# Patient Record
Sex: MALE | Race: WHITE | NOT HISPANIC OR LATINO | URBAN - METROPOLITAN AREA
[De-identification: names, ages, dates, MRNs, and addresses within clinical notes are randomized per-mention and may not be internally consistent; named-entity substitution may affect disease eponyms.]

---

## 2019-01-26 ENCOUNTER — INPATIENT (INPATIENT)
Facility: HOSPITAL | Age: 59
LOS: 6 days | Discharge: HOME HEALTH | End: 2019-02-02
Attending: COLON & RECTAL SURGERY | Admitting: COLON & RECTAL SURGERY
Payer: COMMERCIAL

## 2019-01-26 VITALS
SYSTOLIC BLOOD PRESSURE: 100 MMHG | TEMPERATURE: 98 F | DIASTOLIC BLOOD PRESSURE: 70 MMHG | OXYGEN SATURATION: 94 % | HEIGHT: 73 IN | RESPIRATION RATE: 18 BRPM | HEART RATE: 123 BPM | WEIGHT: 225.09 LBS

## 2019-01-26 LAB
ALBUMIN SERPL ELPH-MCNC: 3.6 G/DL — SIGNIFICANT CHANGE UP (ref 3.3–5)
ALP SERPL-CCNC: 47 U/L — SIGNIFICANT CHANGE UP (ref 40–120)
ALT FLD-CCNC: 30 U/L — SIGNIFICANT CHANGE UP (ref 12–78)
ANION GAP SERPL CALC-SCNC: 9 MMOL/L — SIGNIFICANT CHANGE UP (ref 5–17)
APPEARANCE UR: CLEAR — SIGNIFICANT CHANGE UP
APTT BLD: 25.9 SEC — LOW (ref 27.5–36.3)
AST SERPL-CCNC: 17 U/L — SIGNIFICANT CHANGE UP (ref 15–37)
BACTERIA # UR AUTO: ABNORMAL
BASOPHILS # BLD AUTO: 0 K/UL — SIGNIFICANT CHANGE UP (ref 0–0.2)
BASOPHILS NFR BLD AUTO: 0 % — SIGNIFICANT CHANGE UP (ref 0–2)
BILIRUB SERPL-MCNC: 0.8 MG/DL — SIGNIFICANT CHANGE UP (ref 0.2–1.2)
BILIRUB UR-MCNC: NEGATIVE — SIGNIFICANT CHANGE UP
BLD GP AB SCN SERPL QL: SIGNIFICANT CHANGE UP
BUN SERPL-MCNC: 28 MG/DL — HIGH (ref 7–23)
CALCIUM SERPL-MCNC: 9 MG/DL — SIGNIFICANT CHANGE UP (ref 8.5–10.1)
CHLORIDE SERPL-SCNC: 106 MMOL/L — SIGNIFICANT CHANGE UP (ref 96–108)
CO2 SERPL-SCNC: 28 MMOL/L — SIGNIFICANT CHANGE UP (ref 22–31)
COLOR SPEC: YELLOW — SIGNIFICANT CHANGE UP
CREAT SERPL-MCNC: 1.61 MG/DL — HIGH (ref 0.5–1.3)
DIFF PNL FLD: NEGATIVE — SIGNIFICANT CHANGE UP
EOSINOPHIL # BLD AUTO: 0 K/UL — SIGNIFICANT CHANGE UP (ref 0–0.5)
EOSINOPHIL NFR BLD AUTO: 0 % — SIGNIFICANT CHANGE UP (ref 0–6)
EPI CELLS # UR: SIGNIFICANT CHANGE UP
GLUCOSE SERPL-MCNC: 97 MG/DL — SIGNIFICANT CHANGE UP (ref 70–99)
GLUCOSE UR QL: NEGATIVE MG/DL — SIGNIFICANT CHANGE UP
HCT VFR BLD CALC: 45.7 % — SIGNIFICANT CHANGE UP (ref 39–50)
HCV AB S/CO SERPL IA: 0.09 S/CO — SIGNIFICANT CHANGE UP
HCV AB SERPL-IMP: SIGNIFICANT CHANGE UP
HGB BLD-MCNC: 14.9 G/DL — SIGNIFICANT CHANGE UP (ref 13–17)
INR BLD: 1.17 RATIO — HIGH (ref 0.88–1.16)
KETONES UR-MCNC: NEGATIVE — SIGNIFICANT CHANGE UP
LACTATE SERPL-SCNC: 2.2 MMOL/L — HIGH (ref 0.7–2)
LACTATE SERPL-SCNC: 3 MMOL/L — HIGH (ref 0.7–2)
LACTATE SERPL-SCNC: 3.6 MMOL/L — HIGH (ref 0.7–2)
LEUKOCYTE ESTERASE UR-ACNC: NEGATIVE — SIGNIFICANT CHANGE UP
LIDOCAIN IGE QN: 172 U/L — SIGNIFICANT CHANGE UP (ref 73–393)
LYMPHOCYTES # BLD AUTO: 0.65 K/UL — LOW (ref 1–3.3)
LYMPHOCYTES # BLD AUTO: 4 % — LOW (ref 13–44)
MAGNESIUM SERPL-MCNC: 1.8 MG/DL — SIGNIFICANT CHANGE UP (ref 1.6–2.6)
MANUAL SMEAR VERIFICATION: SIGNIFICANT CHANGE UP
MCHC RBC-ENTMCNC: 30.9 PG — SIGNIFICANT CHANGE UP (ref 27–34)
MCHC RBC-ENTMCNC: 32.6 GM/DL — SIGNIFICANT CHANGE UP (ref 32–36)
MCV RBC AUTO: 94.8 FL — SIGNIFICANT CHANGE UP (ref 80–100)
METAMYELOCYTES # FLD: 3 % — HIGH (ref 0–0)
MONOCYTES # BLD AUTO: 0.81 K/UL — SIGNIFICANT CHANGE UP (ref 0–0.9)
MONOCYTES NFR BLD AUTO: 5 % — SIGNIFICANT CHANGE UP (ref 2–14)
MYELOCYTES NFR BLD: 1 % — HIGH (ref 0–0)
NEUTROPHILS # BLD AUTO: 13.57 K/UL — HIGH (ref 1.8–7.4)
NEUTROPHILS NFR BLD AUTO: 59 % — SIGNIFICANT CHANGE UP (ref 43–77)
NEUTS BAND # BLD: 25 % — HIGH (ref 0–8)
NITRITE UR-MCNC: NEGATIVE — SIGNIFICANT CHANGE UP
NRBC # BLD: 0 /100 — SIGNIFICANT CHANGE UP (ref 0–0)
NRBC # BLD: SIGNIFICANT CHANGE UP /100 WBCS (ref 0–0)
PH UR: 5 — SIGNIFICANT CHANGE UP (ref 5–8)
PLAT MORPH BLD: NORMAL — SIGNIFICANT CHANGE UP
PLATELET # BLD AUTO: 192 K/UL — SIGNIFICANT CHANGE UP (ref 150–400)
POTASSIUM SERPL-MCNC: 3.6 MMOL/L — SIGNIFICANT CHANGE UP (ref 3.5–5.3)
POTASSIUM SERPL-SCNC: 3.6 MMOL/L — SIGNIFICANT CHANGE UP (ref 3.5–5.3)
PROT SERPL-MCNC: 6.6 GM/DL — SIGNIFICANT CHANGE UP (ref 6–8.3)
PROT UR-MCNC: 30 MG/DL
PROTHROM AB SERPL-ACNC: 13 SEC — HIGH (ref 10–12.9)
RBC # BLD: 4.82 M/UL — SIGNIFICANT CHANGE UP (ref 4.2–5.8)
RBC # FLD: 14.6 % — HIGH (ref 10.3–14.5)
RBC BLD AUTO: NORMAL — SIGNIFICANT CHANGE UP
RBC CASTS # UR COMP ASSIST: NEGATIVE /HPF — SIGNIFICANT CHANGE UP (ref 0–4)
SODIUM SERPL-SCNC: 143 MMOL/L — SIGNIFICANT CHANGE UP (ref 135–145)
SP GR SPEC: 1.01 — SIGNIFICANT CHANGE UP (ref 1.01–1.02)
TYPE + AB SCN PNL BLD: SIGNIFICANT CHANGE UP
UROBILINOGEN FLD QL: NEGATIVE MG/DL — SIGNIFICANT CHANGE UP
VARIANT LYMPHS # BLD: 3 % — SIGNIFICANT CHANGE UP (ref 0–6)
WBC # BLD: 16.15 K/UL — HIGH (ref 3.8–10.5)
WBC # FLD AUTO: 16.15 K/UL — HIGH (ref 3.8–10.5)
WBC UR QL: SIGNIFICANT CHANGE UP

## 2019-01-26 PROCEDURE — 74177 CT ABD & PELVIS W/CONTRAST: CPT | Mod: 26

## 2019-01-26 PROCEDURE — 99285 EMERGENCY DEPT VISIT HI MDM: CPT

## 2019-01-26 RX ORDER — CARVEDILOL PHOSPHATE 80 MG/1
12.5 CAPSULE, EXTENDED RELEASE ORAL EVERY 12 HOURS
Qty: 0 | Refills: 0 | Status: DISCONTINUED | OUTPATIENT
Start: 2019-01-26 | End: 2019-02-02

## 2019-01-26 RX ORDER — HEPARIN SODIUM 5000 [USP'U]/ML
5000 INJECTION INTRAVENOUS; SUBCUTANEOUS EVERY 8 HOURS
Qty: 0 | Refills: 0 | Status: DISCONTINUED | OUTPATIENT
Start: 2019-01-26 | End: 2019-02-02

## 2019-01-26 RX ORDER — SODIUM CHLORIDE 9 MG/ML
1000 INJECTION INTRAMUSCULAR; INTRAVENOUS; SUBCUTANEOUS ONCE
Qty: 0 | Refills: 0 | Status: COMPLETED | OUTPATIENT
Start: 2019-01-26 | End: 2019-01-26

## 2019-01-26 RX ORDER — MORPHINE SULFATE 50 MG/1
6 CAPSULE, EXTENDED RELEASE ORAL ONCE
Qty: 0 | Refills: 0 | Status: DISCONTINUED | OUTPATIENT
Start: 2019-01-26 | End: 2019-01-26

## 2019-01-26 RX ORDER — SODIUM CHLORIDE 9 MG/ML
1000 INJECTION, SOLUTION INTRAVENOUS
Qty: 0 | Refills: 0 | Status: DISCONTINUED | OUTPATIENT
Start: 2019-01-26 | End: 2019-01-29

## 2019-01-26 RX ORDER — HYDROMORPHONE HYDROCHLORIDE 2 MG/ML
1 INJECTION INTRAMUSCULAR; INTRAVENOUS; SUBCUTANEOUS EVERY 6 HOURS
Qty: 0 | Refills: 0 | Status: DISCONTINUED | OUTPATIENT
Start: 2019-01-26 | End: 2019-01-26

## 2019-01-26 RX ORDER — ACETAMINOPHEN 500 MG
650 TABLET ORAL EVERY 6 HOURS
Qty: 0 | Refills: 0 | Status: DISCONTINUED | OUTPATIENT
Start: 2019-01-26 | End: 2019-02-02

## 2019-01-26 RX ORDER — PIPERACILLIN AND TAZOBACTAM 4; .5 G/20ML; G/20ML
3.38 INJECTION, POWDER, LYOPHILIZED, FOR SOLUTION INTRAVENOUS ONCE
Qty: 0 | Refills: 0 | Status: COMPLETED | OUTPATIENT
Start: 2019-01-26 | End: 2019-01-26

## 2019-01-26 RX ORDER — PIPERACILLIN AND TAZOBACTAM 4; .5 G/20ML; G/20ML
3.38 INJECTION, POWDER, LYOPHILIZED, FOR SOLUTION INTRAVENOUS EVERY 8 HOURS
Qty: 0 | Refills: 0 | Status: DISCONTINUED | OUTPATIENT
Start: 2019-01-26 | End: 2019-01-27

## 2019-01-26 RX ORDER — HYDROMORPHONE HYDROCHLORIDE 2 MG/ML
1 INJECTION INTRAMUSCULAR; INTRAVENOUS; SUBCUTANEOUS ONCE
Qty: 0 | Refills: 0 | Status: DISCONTINUED | OUTPATIENT
Start: 2019-01-26 | End: 2019-01-26

## 2019-01-26 RX ORDER — HYDROMORPHONE HYDROCHLORIDE 2 MG/ML
1 INJECTION INTRAMUSCULAR; INTRAVENOUS; SUBCUTANEOUS
Qty: 0 | Refills: 0 | Status: DISCONTINUED | OUTPATIENT
Start: 2019-01-26 | End: 2019-01-29

## 2019-01-26 RX ORDER — ONDANSETRON 8 MG/1
8 TABLET, FILM COATED ORAL ONCE
Qty: 0 | Refills: 0 | Status: COMPLETED | OUTPATIENT
Start: 2019-01-26 | End: 2019-01-26

## 2019-01-26 RX ORDER — MORPHINE SULFATE 50 MG/1
8 CAPSULE, EXTENDED RELEASE ORAL ONCE
Qty: 0 | Refills: 0 | Status: DISCONTINUED | OUTPATIENT
Start: 2019-01-26 | End: 2019-01-26

## 2019-01-26 RX ADMIN — ONDANSETRON 8 MILLIGRAM(S): 8 TABLET, FILM COATED ORAL at 01:30

## 2019-01-26 RX ADMIN — SODIUM CHLORIDE 125 MILLILITER(S): 9 INJECTION, SOLUTION INTRAVENOUS at 10:20

## 2019-01-26 RX ADMIN — SODIUM CHLORIDE 2000 MILLILITER(S): 9 INJECTION INTRAMUSCULAR; INTRAVENOUS; SUBCUTANEOUS at 05:30

## 2019-01-26 RX ADMIN — SODIUM CHLORIDE 125 MILLILITER(S): 9 INJECTION, SOLUTION INTRAVENOUS at 18:20

## 2019-01-26 RX ADMIN — PIPERACILLIN AND TAZOBACTAM 25 GRAM(S): 4; .5 INJECTION, POWDER, LYOPHILIZED, FOR SOLUTION INTRAVENOUS at 14:36

## 2019-01-26 RX ADMIN — MORPHINE SULFATE 8 MILLIGRAM(S): 50 CAPSULE, EXTENDED RELEASE ORAL at 01:31

## 2019-01-26 RX ADMIN — HYDROMORPHONE HYDROCHLORIDE 1 MILLIGRAM(S): 2 INJECTION INTRAMUSCULAR; INTRAVENOUS; SUBCUTANEOUS at 16:37

## 2019-01-26 RX ADMIN — HYDROMORPHONE HYDROCHLORIDE 1 MILLIGRAM(S): 2 INJECTION INTRAMUSCULAR; INTRAVENOUS; SUBCUTANEOUS at 22:28

## 2019-01-26 RX ADMIN — HYDROMORPHONE HYDROCHLORIDE 1 MILLIGRAM(S): 2 INJECTION INTRAMUSCULAR; INTRAVENOUS; SUBCUTANEOUS at 11:46

## 2019-01-26 RX ADMIN — MORPHINE SULFATE 6 MILLIGRAM(S): 50 CAPSULE, EXTENDED RELEASE ORAL at 05:41

## 2019-01-26 RX ADMIN — PIPERACILLIN AND TAZOBACTAM 200 GRAM(S): 4; .5 INJECTION, POWDER, LYOPHILIZED, FOR SOLUTION INTRAVENOUS at 05:42

## 2019-01-26 RX ADMIN — Medication 650 MILLIGRAM(S): at 21:37

## 2019-01-26 RX ADMIN — HYDROMORPHONE HYDROCHLORIDE 1 MILLIGRAM(S): 2 INJECTION INTRAMUSCULAR; INTRAVENOUS; SUBCUTANEOUS at 19:04

## 2019-01-26 RX ADMIN — MORPHINE SULFATE 8 MILLIGRAM(S): 50 CAPSULE, EXTENDED RELEASE ORAL at 02:05

## 2019-01-26 RX ADMIN — HYDROMORPHONE HYDROCHLORIDE 1 MILLIGRAM(S): 2 INJECTION INTRAMUSCULAR; INTRAVENOUS; SUBCUTANEOUS at 11:30

## 2019-01-26 RX ADMIN — SODIUM CHLORIDE 2000 MILLILITER(S): 9 INJECTION INTRAMUSCULAR; INTRAVENOUS; SUBCUTANEOUS at 01:34

## 2019-01-26 RX ADMIN — MORPHINE SULFATE 6 MILLIGRAM(S): 50 CAPSULE, EXTENDED RELEASE ORAL at 06:11

## 2019-01-26 RX ADMIN — HEPARIN SODIUM 5000 UNIT(S): 5000 INJECTION INTRAVENOUS; SUBCUTANEOUS at 21:24

## 2019-01-26 RX ADMIN — PIPERACILLIN AND TAZOBACTAM 25 GRAM(S): 4; .5 INJECTION, POWDER, LYOPHILIZED, FOR SOLUTION INTRAVENOUS at 21:25

## 2019-01-26 RX ADMIN — CARVEDILOL PHOSPHATE 12.5 MILLIGRAM(S): 80 CAPSULE, EXTENDED RELEASE ORAL at 18:20

## 2019-01-26 NOTE — ED ADULT NURSE NOTE - NSIMPLEMENTINTERV_GEN_ALL_ED
Implemented All Universal Safety Interventions:  [place] to call system. Call bell, personal items and telephone within reach. Instruct patient to call for assistance. Room bathroom lighting operational. Non-slip footwear when patient is off stretcher. Physically safe environment: no spills, clutter or unnecessary equipment. Stretcher in lowest position, wheels locked, appropriate side rails in place.

## 2019-01-26 NOTE — ED PROVIDER NOTE - OBJECTIVE STATEMENT
pt presents with 4 hours genralized achy non radiating abd pain 8/10 in severity. denies fever. denies HA or neck pain. no chest pain or sob. . no n/v/d. no urinary f/u/d. no back pain. no motor or sensory deficits. denies illicit drug use. no recent travel. no rash. no other acute issues symptoms or concerns denies melena or hematochezia

## 2019-01-26 NOTE — ED ADULT NURSE REASSESSMENT NOTE - NS ED NURSE REASSESS COMMENT FT1
Patient currently sleeping, no s/s of distress present. Safety & comfort measures in place, spouse bedside. Will continue to monitor.

## 2019-01-26 NOTE — ED ADULT NURSE NOTE - OBJECTIVE STATEMENT
pt. c/o abd. pain with nausea that began a "few hours ago." Pt. c/o constipation x2 days with increased burping,  pt. has 10/10 pain on RLQ. pt. denies SOB, CP, numbness, weakness. Pt. has increased pain when trying to sit up.

## 2019-01-26 NOTE — ED ADULT NURSE REASSESSMENT NOTE - NS ED NURSE REASSESS COMMENT FT1
Patient resting comfortably in bed, no s/s of distress present. VSS, IVF infusing as prescribed. Hand-off report to RN Smita, awaiting transport to . Safety & comfort measures in place, spouse bedside. Purposeful active rounding on my time.

## 2019-01-26 NOTE — H&P ADULT - NSHPPHYSICALEXAM_GEN_ALL_CORE
Vital Signs Last 24 Hrs  T(C): 36.5 (26 Jan 2019 08:18), Max: 36.7 (26 Jan 2019 05:00)  T(F): 97.7 (26 Jan 2019 08:18), Max: 98.1 (26 Jan 2019 05:00)  HR: 101 (26 Jan 2019 08:18) (101 - 123)  BP: 98/67 (26 Jan 2019 08:18) (90/62 - 108/68)  BP(mean): --  RR: 17 (26 Jan 2019 08:18) (16 - 22)  SpO2: 100% (26 Jan 2019 08:18) (92% - 100%)

## 2019-01-26 NOTE — ED PROVIDER NOTE - CARE PLAN
Principal Discharge DX:	Abdominal pain Principal Discharge DX:	Abdominal pain  Secondary Diagnosis:	Diverticulitis of colon

## 2019-01-26 NOTE — ED ADULT NURSE REASSESSMENT NOTE - NS ED NURSE REASSESS COMMENT FT1
pt. still unable to urinate, ED MD made aware, MD states no straight cath or logan to be placed at this time and no bladder scan, pt. denies hx of dysuria. ED MD states he will placed order for IV antibiotics and no blood cultures needed at this time.

## 2019-01-26 NOTE — ED ADULT NURSE REASSESSMENT NOTE - NS ED NURSE REASSESS COMMENT FT1
Patient reports partial relief s/p dilaudid as prescribed, pain now 8/10. MD Avila notified, no new orders at this time. Will continue to monitor.

## 2019-01-26 NOTE — H&P ADULT - HISTORY OF PRESENT ILLNESS
57 y/o male with HTN on coreg presents with 1 day h/o lower abd pain found to have Sig diverticulitis on CT.  Last BM 2 days ago.  Given 3L IVF and zosyn in ED.  Lactate 3-->3.6.  BP OK.    On my exam, distress from abd pain, MAP 80s, c/o abd pain and thirst.  Leukocytosis and Cr 1.6.  First episode.  ROS o/w negative

## 2019-01-26 NOTE — ED ADULT NURSE REASSESSMENT NOTE - NS ED NURSE REASSESS COMMENT FT1
Patient currently sleeping comfortably in bed. No s/s of distress present. Hospitalist consult pending. Safety & comfort measures in place, family member bedside. Will continue to monitor.

## 2019-01-26 NOTE — H&P ADULT - ASSESSMENT
IMP:    59 y/o male with HTN on coreg presents with 1 day h/o lower abd pain found to have Sig diverticulitis--sepsis on CT.    MARY vs CKD (no prior labs)    Plan:    Admit to Med surg    Diverticulitis--Cont with pip/tazo--LR at 125--NPO--Pain control with Dilaudid.  Defer surgical consult for now unless clinical worsening or not responding to Rx    HTN--cont with Coreg     Follow Renal Fx    DVT prophy--sqhep

## 2019-01-27 DIAGNOSIS — K57.32 DIVERTICULITIS OF LARGE INTESTINE WITHOUT PERFORATION OR ABSCESS WITHOUT BLEEDING: ICD-10-CM

## 2019-01-27 LAB
ANION GAP SERPL CALC-SCNC: 7 MMOL/L — SIGNIFICANT CHANGE UP (ref 5–17)
BUN SERPL-MCNC: 34 MG/DL — HIGH (ref 7–23)
CALCIUM SERPL-MCNC: 8.1 MG/DL — LOW (ref 8.5–10.1)
CHLORIDE SERPL-SCNC: 106 MMOL/L — SIGNIFICANT CHANGE UP (ref 96–108)
CO2 SERPL-SCNC: 28 MMOL/L — SIGNIFICANT CHANGE UP (ref 22–31)
CREAT SERPL-MCNC: 1.55 MG/DL — HIGH (ref 0.5–1.3)
CULTURE RESULTS: NO GROWTH — SIGNIFICANT CHANGE UP
GLUCOSE SERPL-MCNC: 139 MG/DL — HIGH (ref 70–99)
HCT VFR BLD CALC: 37.2 % — LOW (ref 39–50)
HGB BLD-MCNC: 11.8 G/DL — LOW (ref 13–17)
LACTATE SERPL-SCNC: 1.5 MMOL/L — SIGNIFICANT CHANGE UP (ref 0.7–2)
MCHC RBC-ENTMCNC: 30.9 PG — SIGNIFICANT CHANGE UP (ref 27–34)
MCHC RBC-ENTMCNC: 31.7 GM/DL — LOW (ref 32–36)
MCV RBC AUTO: 97.4 FL — SIGNIFICANT CHANGE UP (ref 80–100)
NRBC # BLD: 0 /100 WBCS — SIGNIFICANT CHANGE UP (ref 0–0)
PLATELET # BLD AUTO: 155 K/UL — SIGNIFICANT CHANGE UP (ref 150–400)
POTASSIUM SERPL-MCNC: 4.2 MMOL/L — SIGNIFICANT CHANGE UP (ref 3.5–5.3)
POTASSIUM SERPL-SCNC: 4.2 MMOL/L — SIGNIFICANT CHANGE UP (ref 3.5–5.3)
RBC # BLD: 3.82 M/UL — LOW (ref 4.2–5.8)
RBC # FLD: 15.3 % — HIGH (ref 10.3–14.5)
SODIUM SERPL-SCNC: 141 MMOL/L — SIGNIFICANT CHANGE UP (ref 135–145)
SPECIMEN SOURCE: SIGNIFICANT CHANGE UP
WBC # BLD: 20.54 K/UL — HIGH (ref 3.8–10.5)
WBC # FLD AUTO: 20.54 K/UL — HIGH (ref 3.8–10.5)

## 2019-01-27 RX ORDER — ERTAPENEM SODIUM 1 G/1
INJECTION, POWDER, LYOPHILIZED, FOR SOLUTION INTRAMUSCULAR; INTRAVENOUS
Qty: 0 | Refills: 0 | Status: DISCONTINUED | OUTPATIENT
Start: 2019-01-27 | End: 2019-01-29

## 2019-01-27 RX ORDER — ERTAPENEM SODIUM 1 G/1
1000 INJECTION, POWDER, LYOPHILIZED, FOR SOLUTION INTRAMUSCULAR; INTRAVENOUS EVERY 24 HOURS
Qty: 0 | Refills: 0 | Status: DISCONTINUED | OUTPATIENT
Start: 2019-01-28 | End: 2019-01-29

## 2019-01-27 RX ORDER — ONDANSETRON 8 MG/1
4 TABLET, FILM COATED ORAL ONCE
Qty: 0 | Refills: 0 | Status: COMPLETED | OUTPATIENT
Start: 2019-01-27 | End: 2019-01-27

## 2019-01-27 RX ORDER — IBUPROFEN 200 MG
400 TABLET ORAL ONCE
Qty: 0 | Refills: 0 | Status: COMPLETED | OUTPATIENT
Start: 2019-01-27 | End: 2019-01-27

## 2019-01-27 RX ORDER — ERTAPENEM SODIUM 1 G/1
1000 INJECTION, POWDER, LYOPHILIZED, FOR SOLUTION INTRAMUSCULAR; INTRAVENOUS ONCE
Qty: 0 | Refills: 0 | Status: COMPLETED | OUTPATIENT
Start: 2019-01-27 | End: 2019-01-27

## 2019-01-27 RX ADMIN — HYDROMORPHONE HYDROCHLORIDE 1 MILLIGRAM(S): 2 INJECTION INTRAMUSCULAR; INTRAVENOUS; SUBCUTANEOUS at 01:43

## 2019-01-27 RX ADMIN — ERTAPENEM SODIUM 120 MILLIGRAM(S): 1 INJECTION, POWDER, LYOPHILIZED, FOR SOLUTION INTRAMUSCULAR; INTRAVENOUS at 18:52

## 2019-01-27 RX ADMIN — PIPERACILLIN AND TAZOBACTAM 25 GRAM(S): 4; .5 INJECTION, POWDER, LYOPHILIZED, FOR SOLUTION INTRAVENOUS at 05:06

## 2019-01-27 RX ADMIN — CARVEDILOL PHOSPHATE 12.5 MILLIGRAM(S): 80 CAPSULE, EXTENDED RELEASE ORAL at 17:06

## 2019-01-27 RX ADMIN — HEPARIN SODIUM 5000 UNIT(S): 5000 INJECTION INTRAVENOUS; SUBCUTANEOUS at 21:23

## 2019-01-27 RX ADMIN — Medication 400 MILLIGRAM(S): at 02:14

## 2019-01-27 RX ADMIN — HYDROMORPHONE HYDROCHLORIDE 1 MILLIGRAM(S): 2 INJECTION INTRAMUSCULAR; INTRAVENOUS; SUBCUTANEOUS at 05:05

## 2019-01-27 RX ADMIN — SODIUM CHLORIDE 125 MILLILITER(S): 9 INJECTION, SOLUTION INTRAVENOUS at 18:53

## 2019-01-27 RX ADMIN — SODIUM CHLORIDE 125 MILLILITER(S): 9 INJECTION, SOLUTION INTRAVENOUS at 03:06

## 2019-01-27 RX ADMIN — HYDROMORPHONE HYDROCHLORIDE 1 MILLIGRAM(S): 2 INJECTION INTRAMUSCULAR; INTRAVENOUS; SUBCUTANEOUS at 20:16

## 2019-01-27 RX ADMIN — HEPARIN SODIUM 5000 UNIT(S): 5000 INJECTION INTRAVENOUS; SUBCUTANEOUS at 13:51

## 2019-01-27 RX ADMIN — HYDROMORPHONE HYDROCHLORIDE 1 MILLIGRAM(S): 2 INJECTION INTRAMUSCULAR; INTRAVENOUS; SUBCUTANEOUS at 11:28

## 2019-01-27 RX ADMIN — SODIUM CHLORIDE 125 MILLILITER(S): 9 INJECTION, SOLUTION INTRAVENOUS at 10:53

## 2019-01-27 RX ADMIN — ONDANSETRON 4 MILLIGRAM(S): 8 TABLET, FILM COATED ORAL at 21:23

## 2019-01-27 RX ADMIN — PIPERACILLIN AND TAZOBACTAM 25 GRAM(S): 4; .5 INJECTION, POWDER, LYOPHILIZED, FOR SOLUTION INTRAVENOUS at 13:49

## 2019-01-27 RX ADMIN — HYDROMORPHONE HYDROCHLORIDE 1 MILLIGRAM(S): 2 INJECTION INTRAMUSCULAR; INTRAVENOUS; SUBCUTANEOUS at 14:58

## 2019-01-27 RX ADMIN — CARVEDILOL PHOSPHATE 12.5 MILLIGRAM(S): 80 CAPSULE, EXTENDED RELEASE ORAL at 08:17

## 2019-01-27 NOTE — PROGRESS NOTE ADULT - SUBJECTIVE AND OBJECTIVE BOX
CC: Abd pain	  History of Present Illness: 	  59 y/o male with HTN on coreg presents with 1 day h/o lower abd pain found to have Sig diverticulitis on CT.  Last BM 2 days ago.  Given 3L IVF and zosyn in ED.  Lactate 3-->3.6.  BP OK.   Pt was admitted for severe sepsis secondary to acute diverticulitis.  He is NPO, on IV abx and fluids with pain management.  He is comfortable at bedside but weak with mild abd discomfort on pain meds.  + fevers, no N/V.      REVIEW OF SYSTEMS: All other review of systems is negative unless indicated above.    Vital Signs Last 24 Hrs  T(C): 36.9 (2019 11:27), Max: 38.9 (2019 01:38)  T(F): 98.4 (2019 11:27), Max: 102.1 (2019 01:38)  HR: 82 (2019 11:27) (82 - 108)  BP: 106/67 (2019 11:27) (106/67 - 144/82)  BP(mean): --  RR: 20 (2019 04:15) (17 - 20)  SpO2: 97% (2019 11:27) (93% - 100%)    PHYSICAL EXAM:    Constitutional: NAD, awake and alert, ill-appearing  HEENT: PERR, EOMI, Normal Hearing, MMM  Neck: Soft and supple  Respiratory: Breath sounds are clear bilaterally, No wheezing, rales or rhonchi  Cardiovascular: S1 and S2, regular rate and rhythm, no Murmurs, gallops or rubs  Gastrointestinal: Bowel Sounds present, + LLQ tenderness  Extremities: No peripheral edema  Neurological: A/O x 3, no focal deficits in my limited exam  Skin: No rashes        MEDICATIONS  (STANDING):  carvedilol 12.5 milliGRAM(s) Oral every 12 hours  heparin  Injectable 5000 Unit(s) SubCutaneous every 8 hours  lactated ringers. 1000 milliLiter(s) (125 mL/Hr) IV Continuous <Continuous>  piperacillin/tazobactam IVPB. 3.375 Gram(s) IV Intermittent every 8 hours    MEDICATIONS  (PRN):  acetaminophen   Tablet .. 650 milliGRAM(s) Oral every 6 hours PRN Temp greater or equal to 38.5C (101.3F), Moderate Pain (4 - 6)  HYDROmorphone  Injectable 1 milliGRAM(s) IV Push every 3 hours PRN Severe Pain (7 - 10)                              11.8   20.54 )-----------( 155      ( 2019 03:02 )             37.2         141  |  106  |  34<H>  ----------------------------<  139<H>  4.2   |  28  |  1.55<H>    Ca    8.1<L>      2019 03:02  Mg     1.8         TPro  6.6  /  Alb  3.6  /  TBili  0.8  /  DBili  x   /  AST  17  /  ALT  30  /  AlkPhos  47      CAPILLARY BLOOD GLUCOSE        LIVER FUNCTIONS - ( 2019 01:18 )  Alb: 3.6 g/dL / Pro: 6.6 gm/dL / ALK PHOS: 47 U/L / ALT: 30 U/L / AST: 17 U/L / GGT: x           PT/INR - ( 2019 01:18 )   PT: 13.0 sec;   INR: 1.17 ratio         PTT - ( 2019 01:18 )  PTT:25.9 sec  Urinalysis Basic - ( 2019 11:25 )    Color: Yellow / Appearance: Clear / S.010 / pH: x  Gluc: x / Ketone: Negative  / Bili: Negative / Urobili: Negative mg/dL   Blood: x / Protein: 30 mg/dL / Nitrite: Negative   Leuk Esterase: Negative / RBC: Negative /HPF / WBC 0-2   Sq Epi: x / Non Sq Epi: Occasional / Bacteria: Few      Assessment and Plan:  59 y/o male with HTN presents with 1 day h/o lower abd pain found to have diverticulitis.    Severe sepsis secondary to Diverticulitis  -CT suggestive of prox sigmoid diverticulitis   -Cont with pip/tazo  -c/w LR at 125  -NPO  -Pain control   -Surgical and GI evaluation    Acute renail failure: secondary to sepsis  -IVFs  -monitor Scr    HTN  -cont with Coreg         DVT prophy  -sqhep

## 2019-01-27 NOTE — CONSULT NOTE ADULT - PROBLEM SELECTOR RECOMMENDATION 9
Continue NPO,IV Antibiotics and Close observation  Possible surgical intervention if pain progressively worsens with high WBC and CT change  Repeat CT Monday

## 2019-01-27 NOTE — CONSULT NOTE ADULT - SUBJECTIVE AND OBJECTIVE BOX
CC; diffuse abdominal pain     HPI: 57 y/o male with PMH of HTN and asthma present with 1 day h/o diffuse abdominal pain found to have sigmoid diverticulitis on CT. Patient reports pain started on Friday night and worsened.  In the ED patient was found to have lactate of 3 and was given 3L bolus and zosyn antibiotics.     On exam today patient reports improved pain no more localized to the RLQ. Patient WBC increase overnight.    PMHx: HTN, asthma   PShx: orthopedic sx   Meds: coreg   Allx: NKDA     Vitals;   Vital Signs Last 24 Hrs  T(C): 36.7 (27 Jan 2019 16:32), Max: 38.9 (27 Jan 2019 01:38)  T(F): 98.1 (27 Jan 2019 16:32), Max: 102.1 (27 Jan 2019 01:38)  HR: 84 (27 Jan 2019 16:32) (82 - 108)  BP: 124/76 (27 Jan 2019 16:32) (106/67 - 127/78)  BP(mean): --  RR: 18 (27 Jan 2019 16:32) (18 - 20)  SpO2: 98% (27 Jan 2019 16:32) (93% - 98%)    Gen: NAD, cooperative   HEENT: supple, no JVD, EOMI  Lungs: 2L NC, CTA b/l, aerating well   CV: S1 and S2 present  Abd: TTP, distended, no RGR   Ext: FROM, pulse present, no cyanosis, No edema   Skin: warm, dry     Labs:                         11.8   20.54 )-----------( 155      ( 27 Jan 2019 03:02 )             37.2   01-27    141  |  106  |  34<H>  ----------------------------<  139<H>  4.2   |  28  |  1.55<H>    Ca    8.1<L>      27 Jan 2019 03:02  Mg     1.8     01-26    TPro  6.6  /  Alb  3.6  /  TBili  0.8  /  DBili  x   /  AST  17  /  ALT  30  /  AlkPhos  47  01-26  PT/INR - ( 26 Jan 2019 01:18 )   PT: 13.0 sec;   INR: 1.17 ratio         PTT - ( 26 Jan 2019 01:18 )  PTT:25.9 sec    Imaging:     EXAM:  CT ABDOMEN AND PELVIS OC IC                        PROCEDURE DATE:  01/26/2019    INTERPRETATION:  CT ABDOMEN AND PELVIS DATED 1/26/2018.  CLINICAL INFORMATION:  Diffuse abdominal pain.    TECHNIQUE:  Axial CT images through the abdomen and pelvis are acquired   with administration of intravenous contrast. Images are reformatted in   the sagittal and coronal planes. 90cc of Omnipaque 350 were administered   without event.  10cc were discarded.    No prior studies are available for comparison.    FINDINGS:    There is bibasilar dependent and platelike atelectasis.    The liver, gallbladder, biliary tree, pancreas, spleen, adrenal glands,   and kidneys are unremarkable apart from left renal parapelvic cysts. The   urinary bladder is collapsed. The prostate gland is not enlarged.    The stomach is mildly distended. There is no bowel obstruction. There is   descending and sigmoid colonic diverticulosis with short segment focal   thickening of the proximal sigmoid colon about an inflamed diverticulum.   There are surrounding inflammatory changes and small volume of complex   fluid tracking into the pelvis. A normal appendix is identified. There is   no pneumoperitoneum or loculated collection to suggest abscess.    There is no significant abdominal or pelvic lymphadenopathy. There is a   subcentimeter calcified periportal lymph node. There is mild   atherosclerotic calcification of the aortoiliac tree. The abdominal aorta   is normal in caliber.    There is a smallfat-containing right inguinal hernia.    The osseous structures are unremarkable apart from multilevel   degenerative changes of the spine and mild degenerative changes of the   hips.      IMPRESSION:    Acute proximal sigmoid diverticulitis with surrounding inflammatory   change and small volume of complex fluid tracking into the pelvis. No   extraluminal free air or loculated collection to suggest abscess.    NYLA CERDA   This document has been electronically signed. Jan 26 2019  3:19AM

## 2019-01-27 NOTE — CONSULT NOTE ADULT - ASSESSMENT
57 y/o M presents with 1st episode of sigmoid diverticulitis     Plan:   monitor vitals   pain control   nausea control   Diet: NPO  IVF hydration   change abx from zosyn to ertapenem   serial abdominal exams   monitoring bowel function   encourage IS use  encourage OOB/A  DVT/GI ppx   continue medical management per primary team   continue to monitor leukocytosis     Plan discussed with attending, Dr Ramirez

## 2019-01-27 NOTE — CONSULT NOTE ADULT - SUBJECTIVE AND OBJECTIVE BOX
HPI:  59 y/o male with HTN on coreg presents with 1 day h/o lower abd pain found to have Sig diverticulitis on CT.  Last BM 2 days ago.  Given 3L IVF and zosyn in ED.  Lactate 3-->3.6.  BP OK.    On my exam, distress from abd pain, MAP 80s, c/o abd pain and thirst.  Leukocytosis and Cr 1.6.  First episode.  ROS o/w negative (26 Jan 2019 10:00)      PAST MEDICAL & SURGICAL HISTORY:  No significant past surgical history      MEDICATIONS  (STANDING):  carvedilol 12.5 milliGRAM(s) Oral every 12 hours  heparin  Injectable 5000 Unit(s) SubCutaneous every 8 hours  lactated ringers. 1000 milliLiter(s) (125 mL/Hr) IV Continuous <Continuous>  piperacillin/tazobactam IVPB. 3.375 Gram(s) IV Intermittent every 8 hours    MEDICATIONS  (PRN):  acetaminophen   Tablet .. 650 milliGRAM(s) Oral every 6 hours PRN Temp greater or equal to 38.5C (101.3F), Moderate Pain (4 - 6)  HYDROmorphone  Injectable 1 milliGRAM(s) IV Push every 3 hours PRN Severe Pain (7 - 10)      Allergies    No Known Allergies    Intolerances        SOCIAL HISTORY:    FAMILY HISTORY:  No pertinent family history in first degree relatives   Non-contributory    REVIEW OF SYSTEMS      General:	    Respiratory and Thorax:  	  Cardiovascular:	    Gastrointestinal:	    Musculoskeletal:	   Vital Signs Last 24 Hrs  T(C): 36.9 (27 Jan 2019 11:27), Max: 38.9 (27 Jan 2019 01:38)  T(F): 98.4 (27 Jan 2019 11:27), Max: 102.1 (27 Jan 2019 01:38)  HR: 82 (27 Jan 2019 11:27) (82 - 108)  BP: 106/67 (27 Jan 2019 11:27) (106/67 - 144/82)  BP(mean): --  RR: 20 (27 Jan 2019 04:15) (18 - 20)  SpO2: 97% (27 Jan 2019 11:27) (93% - 98%)    HEENT :No Pallor.No icterus. EOMI,PERLAA  Chest : Clear to Auscultation  CVS : S1S2 Normal.No murmurs.  Abdomen: Soft.LLQtender .Normal bowel sounds.No Organomegaly.  CNS: Alert.Oriented to Time,Place and Person.No focal deficit.  EXT: Normal Range of motion.No pitting edema.    LABS:                        11.8   20.54 )-----------( 155      ( 27 Jan 2019 03:02 )             37.2     01-27    141  |  106  |  34<H>  ----------------------------<  139<H>  4.2   |  28  |  1.55<H>    Ca    8.1<L>      27 Jan 2019 03:02  Mg     1.8     01-26    TPro  6.6  /  Alb  3.6  /  TBili  0.8  /  DBili  x   /  AST  17  /  ALT  30  /  AlkPhos  47  01-26    PT/INR - ( 26 Jan 2019 01:18 )   PT: 13.0 sec;   INR: 1.17 ratio         PTT - ( 26 Jan 2019 01:18 )  PTT:25.9 sec  LIVER FUNCTIONS - ( 26 Jan 2019 01:18 )  Alb: 3.6 g/dL / Pro: 6.6 gm/dL / ALK PHOS: 47 U/L / ALT: 30 U/L / AST: 17 U/L / GGT: x             RADIOLOGY & ADDITIONAL STUDIES:

## 2019-01-28 LAB
ANION GAP SERPL CALC-SCNC: 7 MMOL/L — SIGNIFICANT CHANGE UP (ref 5–17)
BUN SERPL-MCNC: 21 MG/DL — SIGNIFICANT CHANGE UP (ref 7–23)
CALCIUM SERPL-MCNC: 8.6 MG/DL — SIGNIFICANT CHANGE UP (ref 8.5–10.1)
CHLORIDE SERPL-SCNC: 103 MMOL/L — SIGNIFICANT CHANGE UP (ref 96–108)
CO2 SERPL-SCNC: 29 MMOL/L — SIGNIFICANT CHANGE UP (ref 22–31)
CREAT SERPL-MCNC: 0.81 MG/DL — SIGNIFICANT CHANGE UP (ref 0.5–1.3)
ERYTHROCYTE [SEDIMENTATION RATE] IN BLOOD: 104 MM/HR — HIGH (ref 0–20)
GLUCOSE SERPL-MCNC: 99 MG/DL — SIGNIFICANT CHANGE UP (ref 70–99)
HCT VFR BLD CALC: 35.4 % — LOW (ref 39–50)
HGB BLD-MCNC: 11.2 G/DL — LOW (ref 13–17)
MCHC RBC-ENTMCNC: 31 PG — SIGNIFICANT CHANGE UP (ref 27–34)
MCHC RBC-ENTMCNC: 31.6 GM/DL — LOW (ref 32–36)
MCV RBC AUTO: 98.1 FL — SIGNIFICANT CHANGE UP (ref 80–100)
NRBC # BLD: 0 /100 WBCS — SIGNIFICANT CHANGE UP (ref 0–0)
PLATELET # BLD AUTO: 134 K/UL — LOW (ref 150–400)
POTASSIUM SERPL-MCNC: 3.9 MMOL/L — SIGNIFICANT CHANGE UP (ref 3.5–5.3)
POTASSIUM SERPL-SCNC: 3.9 MMOL/L — SIGNIFICANT CHANGE UP (ref 3.5–5.3)
RBC # BLD: 3.61 M/UL — LOW (ref 4.2–5.8)
RBC # FLD: 14.7 % — HIGH (ref 10.3–14.5)
SODIUM SERPL-SCNC: 139 MMOL/L — SIGNIFICANT CHANGE UP (ref 135–145)
WBC # BLD: 15.65 K/UL — HIGH (ref 3.8–10.5)
WBC # FLD AUTO: 15.65 K/UL — HIGH (ref 3.8–10.5)

## 2019-01-28 PROCEDURE — 99233 SBSQ HOSP IP/OBS HIGH 50: CPT

## 2019-01-28 RX ORDER — ONDANSETRON 8 MG/1
4 TABLET, FILM COATED ORAL ONCE
Qty: 0 | Refills: 0 | Status: COMPLETED | OUTPATIENT
Start: 2019-01-28 | End: 2019-01-28

## 2019-01-28 RX ORDER — IPRATROPIUM/ALBUTEROL SULFATE 18-103MCG
3 AEROSOL WITH ADAPTER (GRAM) INHALATION ONCE
Qty: 0 | Refills: 0 | Status: COMPLETED | OUTPATIENT
Start: 2019-01-28 | End: 2019-01-28

## 2019-01-28 RX ADMIN — CARVEDILOL PHOSPHATE 12.5 MILLIGRAM(S): 80 CAPSULE, EXTENDED RELEASE ORAL at 16:56

## 2019-01-28 RX ADMIN — CARVEDILOL PHOSPHATE 12.5 MILLIGRAM(S): 80 CAPSULE, EXTENDED RELEASE ORAL at 05:15

## 2019-01-28 RX ADMIN — HYDROMORPHONE HYDROCHLORIDE 1 MILLIGRAM(S): 2 INJECTION INTRAMUSCULAR; INTRAVENOUS; SUBCUTANEOUS at 00:56

## 2019-01-28 RX ADMIN — SODIUM CHLORIDE 125 MILLILITER(S): 9 INJECTION, SOLUTION INTRAVENOUS at 16:05

## 2019-01-28 RX ADMIN — Medication 650 MILLIGRAM(S): at 16:58

## 2019-01-28 RX ADMIN — SODIUM CHLORIDE 125 MILLILITER(S): 9 INJECTION, SOLUTION INTRAVENOUS at 05:15

## 2019-01-28 RX ADMIN — HYDROMORPHONE HYDROCHLORIDE 1 MILLIGRAM(S): 2 INJECTION INTRAMUSCULAR; INTRAVENOUS; SUBCUTANEOUS at 06:29

## 2019-01-28 RX ADMIN — ERTAPENEM SODIUM 120 MILLIGRAM(S): 1 INJECTION, POWDER, LYOPHILIZED, FOR SOLUTION INTRAMUSCULAR; INTRAVENOUS at 16:55

## 2019-01-28 RX ADMIN — HYDROMORPHONE HYDROCHLORIDE 1 MILLIGRAM(S): 2 INJECTION INTRAMUSCULAR; INTRAVENOUS; SUBCUTANEOUS at 13:06

## 2019-01-28 RX ADMIN — ONDANSETRON 4 MILLIGRAM(S): 8 TABLET, FILM COATED ORAL at 06:29

## 2019-01-28 RX ADMIN — HYDROMORPHONE HYDROCHLORIDE 1 MILLIGRAM(S): 2 INJECTION INTRAMUSCULAR; INTRAVENOUS; SUBCUTANEOUS at 20:24

## 2019-01-28 RX ADMIN — HEPARIN SODIUM 5000 UNIT(S): 5000 INJECTION INTRAVENOUS; SUBCUTANEOUS at 21:07

## 2019-01-28 RX ADMIN — HYDROMORPHONE HYDROCHLORIDE 1 MILLIGRAM(S): 2 INJECTION INTRAMUSCULAR; INTRAVENOUS; SUBCUTANEOUS at 21:02

## 2019-01-28 RX ADMIN — Medication 3 MILLILITER(S): at 06:45

## 2019-01-28 RX ADMIN — Medication 650 MILLIGRAM(S): at 00:57

## 2019-01-28 NOTE — PROGRESS NOTE ADULT - SUBJECTIVE AND OBJECTIVE BOX
CC: Abd pain	  History of Present Illness: 	  59 y/o male with HTN on coreg presents with 1 day h/o lower abd pain found to have Sig diverticulitis on CT.  Last BM 2 days ago.  Given 3L IVF and zosyn in ED.  Lactate 3-->3.6.  BP OK.   Pt was admitted for severe sepsis secondary to acute diverticulitis.  He is NPO, on IV abx and fluids with pain management.  He is comfortable at bedside but weak with mild abd discomfort on pain meds.  + fevers, no N/V.      1/28: Abd pain slowly improving.  Pt overall feeling better today but still weak, has abd discomfort.     REVIEW OF SYSTEMS: All other review of systems is negative unless indicated above.    Vital Signs Last 24 Hrs  T(C): 36.9 (28 Jan 2019 12:10), Max: 37.4 (28 Jan 2019 00:30)  T(F): 98.4 (28 Jan 2019 12:10), Max: 99.4 (28 Jan 2019 00:30)  HR: 77 (28 Jan 2019 12:10) (77 - 87)  BP: 128/89 (28 Jan 2019 12:10) (124/76 - 148/98)  BP(mean): --  RR: 18 (28 Jan 2019 12:10) (18 - 18)  SpO2: 99% (28 Jan 2019 12:10) (90% - 99%)    PHYSICAL EXAM:    Constitutional: NAD, awake and alert, ill-appearing  HEENT: PERR, EOMI, Normal Hearing, MMM  Neck: Soft and supple  Respiratory: Breath sounds are clear bilaterally, No wheezing, rales or rhonchi  Cardiovascular: S1 and S2, regular rate and rhythm, no Murmurs, gallops or rubs  Gastrointestinal: Bowel Sounds present, + LLQ tenderness  Extremities: No peripheral edema  Neurological: A/O x 3, no focal deficits in my limited exam  Skin: No rashes      MEDICATIONS  (STANDING):  carvedilol 12.5 milliGRAM(s) Oral every 12 hours  ertapenem  IVPB      ertapenem  IVPB 1000 milliGRAM(s) IV Intermittent every 24 hours  heparin  Injectable 5000 Unit(s) SubCutaneous every 8 hours  lactated ringers. 1000 milliLiter(s) (125 mL/Hr) IV Continuous <Continuous>    MEDICATIONS  (PRN):  acetaminophen   Tablet .. 650 milliGRAM(s) Oral every 6 hours PRN Temp greater or equal to 38.5C (101.3F), Moderate Pain (4 - 6)  HYDROmorphone  Injectable 1 milliGRAM(s) IV Push every 3 hours PRN Severe Pain (7 - 10)                              11.2   15.65 )-----------( 134      ( 28 Jan 2019 05:52 )             35.4     01-28    139  |  103  |  21  ----------------------------<  99  3.9   |  29  |  0.81    Ca    8.6      28 Jan 2019 05:52      CAPILLARY BLOOD GLUCOSE      Assessment and Plan:  59 y/o male with HTN presents with 1 day h/o lower abd pain found to have diverticulitis.    Severe sepsis secondary to Diverticulitis:  -CT suggestive of prox sigmoid diverticulitis   -pip/tazo changed to ertapenem per colorectal, f/u infectious disease recs.  -IVFs  -NPO  -Pain control   -Surgical and GI evaluation appreciated    Acute renail failure: secondary to sepsis  -IVFs  -monitor Scr    HTN  -cont with Coreg     DVT prophy  -sqhep

## 2019-01-28 NOTE — PROVIDER CONTACT NOTE (OTHER) - SITUATION
dr. lopez was here to see patient today
Tele service spoke with Radha to request routine consult.
notified id service of consult
repeat lactate after 3L bolus at 2.2  trending down from 3.6

## 2019-01-28 NOTE — PROGRESS NOTE ADULT - SUBJECTIVE AND OBJECTIVE BOX
No c/o.   Abd pain somewhat improved. No N/V. Passing flatus, no stools.    MEDICATIONS  (STANDING):  carvedilol 12.5 milliGRAM(s) Oral every 12 hours  ertapenem  IVPB      ertapenem  IVPB 1000 milliGRAM(s) IV Intermittent every 24 hours  heparin  Injectable 5000 Unit(s) SubCutaneous every 8 hours  lactated ringers. 1000 milliLiter(s) (125 mL/Hr) IV Continuous <Continuous>    MEDICATIONS  (PRN):  acetaminophen   Tablet .. 650 milliGRAM(s) Oral every 6 hours PRN Temp greater or equal to 38.5C (101.3F), Moderate Pain (4 - 6)  HYDROmorphone  Injectable 1 milliGRAM(s) IV Push every 3 hours PRN Severe Pain (7 - 10)    Vital Signs Last 24 Hrs  T(C): 36.3 (28 Jan 2019 04:42), Max: 37.4 (28 Jan 2019 00:30)  T(F): 97.4 (28 Jan 2019 04:42), Max: 99.4 (28 Jan 2019 00:30)  HR: 78 (28 Jan 2019 06:50) (78 - 87)  BP: 148/98 (28 Jan 2019 04:42) (106/67 - 148/98)  BP(mean): --  RR: 18 (28 Jan 2019 04:42) (18 - 18)  SpO2: 97% (28 Jan 2019 06:50) (90% - 98%)  I&O's Detail    NAD  ABD exam : soft, tender mostly in lower quadrants, moderate distention, no peritoneal signs                        11.2   15.65 )-----------( 134      ( 28 Jan 2019 05:52 )             35.4     01-28    139  |  103  |  21  ----------------------------<  99  3.9   |  29  |  0.81    Ca    8.6      28 Jan 2019 05:52

## 2019-01-28 NOTE — PROGRESS NOTE ADULT - ASSESSMENT
A/P - Sigmoid diverticulitis    Still somewhat tender though WBC improved. Would continue NPO for now until abdominal exam improves.  Cont IVF and abx and daily labs.  Will cont to follow.

## 2019-01-28 NOTE — DIETITIAN INITIAL EVALUATION ADULT. - OTHER INFO
57 yo M seen as consult for assessment and edu p/w lower abd pain and found to have diverticulitis on CT in ed. PMH HTN. Pt appears overweight upon assessment w/ BMI 29.7. Pt reports losing ~20# over past 6 months d/t following very low carb diet. Pt recalls limiting grains, breads, pastas as well as fruits and vegetables. Wife reports pt eats a significant amount of red meats and pt states he likes following the low CHO diet bc he can "eat all the lorenzo he wants". D/w pt and wife the importance of a general healthy diet and the importance of moderation. Topics discussed were whole grains, fruits, vegetables, fiber, safe wt loss, water consumption, digestive health, sugar in fruit, avoiding high Na intake, lean protein, processed foods, etc. Provided pt w/ written education r/t diverticulitis and discussed the importance of incorporating adequate intake of fiber. Pt was somewhat receptive however does not appear receptive to lifestyle change. Pt w require reinforcement of diet edu. Provided pt w diet office phone number for further questions. Pt currently NPO d/t diverticulitis and noted by GI that as long as no adverse changes occur pt will be advanced to clear liquids tonight. Pt noted w/ watery loose BM 1/26. Recommend to continue to monitor. Pt noted w trace generalized edema. No noted skin breakdown. Reza Arita. Recommendations: 1. Advance diet to clears--->DASH diet as medically feasible. 2. Reinforce diet edu PRN. 3. Monitor wt weekly.

## 2019-01-28 NOTE — PROGRESS NOTE ADULT - SUBJECTIVE AND OBJECTIVE BOX
Interval History:    MEDICATIONS  (STANDING):  carvedilol 12.5 milliGRAM(s) Oral every 12 hours  ertapenem  IVPB      ertapenem  IVPB 1000 milliGRAM(s) IV Intermittent every 24 hours  heparin  Injectable 5000 Unit(s) SubCutaneous every 8 hours  lactated ringers. 1000 milliLiter(s) (125 mL/Hr) IV Continuous <Continuous>    MEDICATIONS  (PRN):  acetaminophen   Tablet .. 650 milliGRAM(s) Oral every 6 hours PRN Temp greater or equal to 38.5C (101.3F), Moderate Pain (4 - 6)  HYDROmorphone  Injectable 1 milliGRAM(s) IV Push every 3 hours PRN Severe Pain (7 - 10)      Daily     Daily   BMI: 29.7 (01-26 @ 00:19)  Change in Weight:  Vital Signs Last 24 Hrs  T(C): 36.3 (28 Jan 2019 04:42), Max: 37.4 (28 Jan 2019 00:30)  T(F): 97.4 (28 Jan 2019 04:42), Max: 99.4 (28 Jan 2019 00:30)  HR: 78 (28 Jan 2019 06:50) (78 - 87)  BP: 148/98 (28 Jan 2019 04:42) (106/67 - 148/98)  BP(mean): --  RR: 18 (28 Jan 2019 04:42) (18 - 18)  SpO2: 97% (28 Jan 2019 06:50) (90% - 98%)  I&O's Detail      PHYSICAL EXAM  General:  Well developed, well nourished, alert and active, no pallor, NAD.  HEENT:    Normal appearance of conjunctiva, ears, nose, lips, oropharynx, and oral mucosa, anicteric.  Neck:  No masses, no asymmetry.  Lymph Nodes:  No lymphadenopathy.   Cardiovascular:  RRR normal S1/S2, no murmur.  Respiratory:  CTA B/L, normal respiratory effort.   Abdominal:   soft, no masses orLLQ tenderness, normoactive BS, NT/ND, no HSM.  Extremities:   No clubbing or cyanosis, normal capillary refill, no edema.   Skin:   No rash, jaundice, lesions, eczema.   Musculoskeletal:  No joint swelling, erythema or tenderness.   Other:     Lab Results:                        11.2   15.65 )-----------( 134      ( 28 Jan 2019 05:52 )             35.4     01-28    139  |  103  |  21  ----------------------------<  99  3.9   |  29  |  0.81    Ca    8.6      28 Jan 2019 05:52          Sedimentation Rate, Erythrocyte: 104 mm/hr (01-28 @ 05:52)      Stool Results:          RADIOLOGY RESULTS:    SURGICAL PATHOLOGY:

## 2019-01-28 NOTE — DIETITIAN INITIAL EVALUATION ADULT. - PERTINENT MEDS FT
MEDICATIONS  (STANDING):  carvedilol 12.5 milliGRAM(s) Oral every 12 hours  ertapenem  IVPB      ertapenem  IVPB 1000 milliGRAM(s) IV Intermittent every 24 hours  heparin  Injectable 5000 Unit(s) SubCutaneous every 8 hours  lactated ringers. 1000 milliLiter(s) (125 mL/Hr) IV Continuous <Continuous>    MEDICATIONS  (PRN):  acetaminophen   Tablet .. 650 milliGRAM(s) Oral every 6 hours PRN Temp greater or equal to 38.5C (101.3F), Moderate Pain (4 - 6)  HYDROmorphone  Injectable 1 milliGRAM(s) IV Push every 3 hours PRN Severe Pain (7 - 10)

## 2019-01-28 NOTE — DIETITIAN INITIAL EVALUATION ADULT. - ORAL INTAKE PTA
fair/pt normally has a good appetite however after lunch on Friday pt reports starting to feel constipated and then p/w abd pain and found to have Sig diverticulitis

## 2019-01-28 NOTE — PHARMACOTHERAPY INTERVENTION NOTE - COMMENTS
Completed med history with patient and wife and confirmed with Dr. Godwin.
restricted antibiotic. recommend ID consult

## 2019-01-28 NOTE — DIETITIAN INITIAL EVALUATION ADULT. - PERTINENT LABORATORY DATA
01-28 Na139 mmol/L Glu 99 mg/dL K+ 3.9 mmol/L Cr  0.81 mg/dL BUN 21 mg/dL Phos n/a   Alb n/a   PAB n/a

## 2019-01-29 LAB
ANION GAP SERPL CALC-SCNC: 6 MMOL/L — SIGNIFICANT CHANGE UP (ref 5–17)
BUN SERPL-MCNC: 12 MG/DL — SIGNIFICANT CHANGE UP (ref 7–23)
CALCIUM SERPL-MCNC: 8.3 MG/DL — LOW (ref 8.5–10.1)
CHLORIDE SERPL-SCNC: 103 MMOL/L — SIGNIFICANT CHANGE UP (ref 96–108)
CO2 SERPL-SCNC: 31 MMOL/L — SIGNIFICANT CHANGE UP (ref 22–31)
CREAT SERPL-MCNC: 0.72 MG/DL — SIGNIFICANT CHANGE UP (ref 0.5–1.3)
GLUCOSE SERPL-MCNC: 106 MG/DL — HIGH (ref 70–99)
HCT VFR BLD CALC: 33.7 % — LOW (ref 39–50)
HGB BLD-MCNC: 10.8 G/DL — LOW (ref 13–17)
MCHC RBC-ENTMCNC: 31 PG — SIGNIFICANT CHANGE UP (ref 27–34)
MCHC RBC-ENTMCNC: 32 GM/DL — SIGNIFICANT CHANGE UP (ref 32–36)
MCV RBC AUTO: 96.8 FL — SIGNIFICANT CHANGE UP (ref 80–100)
NRBC # BLD: 0 /100 WBCS — SIGNIFICANT CHANGE UP (ref 0–0)
PLATELET # BLD AUTO: 151 K/UL — SIGNIFICANT CHANGE UP (ref 150–400)
POTASSIUM SERPL-MCNC: 3.7 MMOL/L — SIGNIFICANT CHANGE UP (ref 3.5–5.3)
POTASSIUM SERPL-SCNC: 3.7 MMOL/L — SIGNIFICANT CHANGE UP (ref 3.5–5.3)
RBC # BLD: 3.48 M/UL — LOW (ref 4.2–5.8)
RBC # FLD: 14.6 % — HIGH (ref 10.3–14.5)
SODIUM SERPL-SCNC: 140 MMOL/L — SIGNIFICANT CHANGE UP (ref 135–145)
WBC # BLD: 8.69 K/UL — SIGNIFICANT CHANGE UP (ref 3.8–10.5)
WBC # FLD AUTO: 8.69 K/UL — SIGNIFICANT CHANGE UP (ref 3.8–10.5)

## 2019-01-29 PROCEDURE — 71045 X-RAY EXAM CHEST 1 VIEW: CPT | Mod: 26

## 2019-01-29 PROCEDURE — 74176 CT ABD & PELVIS W/O CONTRAST: CPT | Mod: 26

## 2019-01-29 RX ORDER — KETOROLAC TROMETHAMINE 30 MG/ML
15 SYRINGE (ML) INJECTION EVERY 8 HOURS
Qty: 0 | Refills: 0 | Status: DISCONTINUED | OUTPATIENT
Start: 2019-01-29 | End: 2019-02-02

## 2019-01-29 RX ORDER — PANTOPRAZOLE SODIUM 20 MG/1
40 TABLET, DELAYED RELEASE ORAL DAILY
Qty: 0 | Refills: 0 | Status: DISCONTINUED | OUTPATIENT
Start: 2019-01-29 | End: 2019-02-01

## 2019-01-29 RX ORDER — OXYCODONE HYDROCHLORIDE 5 MG/1
5 TABLET ORAL EVERY 4 HOURS
Qty: 0 | Refills: 0 | Status: DISCONTINUED | OUTPATIENT
Start: 2019-01-29 | End: 2019-02-02

## 2019-01-29 RX ORDER — IPRATROPIUM/ALBUTEROL SULFATE 18-103MCG
3 AEROSOL WITH ADAPTER (GRAM) INHALATION EVERY 8 HOURS
Qty: 0 | Refills: 0 | Status: DISCONTINUED | OUTPATIENT
Start: 2019-01-29 | End: 2019-02-02

## 2019-01-29 RX ORDER — MEROPENEM 1 G/30ML
1000 INJECTION INTRAVENOUS EVERY 8 HOURS
Qty: 0 | Refills: 0 | Status: DISCONTINUED | OUTPATIENT
Start: 2019-01-29 | End: 2019-02-02

## 2019-01-29 RX ADMIN — HYDROMORPHONE HYDROCHLORIDE 1 MILLIGRAM(S): 2 INJECTION INTRAMUSCULAR; INTRAVENOUS; SUBCUTANEOUS at 01:36

## 2019-01-29 RX ADMIN — MEROPENEM 100 MILLIGRAM(S): 1 INJECTION INTRAVENOUS at 21:39

## 2019-01-29 RX ADMIN — CARVEDILOL PHOSPHATE 12.5 MILLIGRAM(S): 80 CAPSULE, EXTENDED RELEASE ORAL at 18:33

## 2019-01-29 RX ADMIN — Medication 15 MILLIGRAM(S): at 22:55

## 2019-01-29 RX ADMIN — HEPARIN SODIUM 5000 UNIT(S): 5000 INJECTION INTRAVENOUS; SUBCUTANEOUS at 21:39

## 2019-01-29 RX ADMIN — SODIUM CHLORIDE 125 MILLILITER(S): 9 INJECTION, SOLUTION INTRAVENOUS at 00:55

## 2019-01-29 RX ADMIN — OXYCODONE HYDROCHLORIDE 5 MILLIGRAM(S): 5 TABLET ORAL at 14:55

## 2019-01-29 RX ADMIN — HEPARIN SODIUM 5000 UNIT(S): 5000 INJECTION INTRAVENOUS; SUBCUTANEOUS at 14:56

## 2019-01-29 RX ADMIN — HYDROMORPHONE HYDROCHLORIDE 1 MILLIGRAM(S): 2 INJECTION INTRAMUSCULAR; INTRAVENOUS; SUBCUTANEOUS at 05:33

## 2019-01-29 RX ADMIN — HYDROMORPHONE HYDROCHLORIDE 1 MILLIGRAM(S): 2 INJECTION INTRAMUSCULAR; INTRAVENOUS; SUBCUTANEOUS at 00:56

## 2019-01-29 RX ADMIN — HYDROMORPHONE HYDROCHLORIDE 1 MILLIGRAM(S): 2 INJECTION INTRAMUSCULAR; INTRAVENOUS; SUBCUTANEOUS at 08:57

## 2019-01-29 RX ADMIN — OXYCODONE HYDROCHLORIDE 5 MILLIGRAM(S): 5 TABLET ORAL at 15:50

## 2019-01-29 RX ADMIN — HEPARIN SODIUM 5000 UNIT(S): 5000 INJECTION INTRAVENOUS; SUBCUTANEOUS at 05:26

## 2019-01-29 RX ADMIN — HYDROMORPHONE HYDROCHLORIDE 1 MILLIGRAM(S): 2 INJECTION INTRAMUSCULAR; INTRAVENOUS; SUBCUTANEOUS at 06:02

## 2019-01-29 RX ADMIN — Medication 15 MILLIGRAM(S): at 22:25

## 2019-01-29 RX ADMIN — HYDROMORPHONE HYDROCHLORIDE 1 MILLIGRAM(S): 2 INJECTION INTRAMUSCULAR; INTRAVENOUS; SUBCUTANEOUS at 09:10

## 2019-01-29 RX ADMIN — CARVEDILOL PHOSPHATE 12.5 MILLIGRAM(S): 80 CAPSULE, EXTENDED RELEASE ORAL at 05:26

## 2019-01-29 RX ADMIN — SODIUM CHLORIDE 125 MILLILITER(S): 9 INJECTION, SOLUTION INTRAVENOUS at 08:59

## 2019-01-29 RX ADMIN — Medication 3 MILLILITER(S): at 11:39

## 2019-01-29 NOTE — PROGRESS NOTE ADULT - SUBJECTIVE AND OBJECTIVE BOX
Interval History:    MEDICATIONS  (STANDING):  carvedilol 12.5 milliGRAM(s) Oral every 12 hours  ertapenem  IVPB      ertapenem  IVPB 1000 milliGRAM(s) IV Intermittent every 24 hours  heparin  Injectable 5000 Unit(s) SubCutaneous every 8 hours  lactated ringers. 1000 milliLiter(s) (125 mL/Hr) IV Continuous <Continuous>    MEDICATIONS  (PRN):  acetaminophen   Tablet .. 650 milliGRAM(s) Oral every 6 hours PRN Temp greater or equal to 38.5C (101.3F), Moderate Pain (4 - 6)  HYDROmorphone  Injectable 1 milliGRAM(s) IV Push every 3 hours PRN Severe Pain (7 - 10)      Daily     Daily Weight in k.8 (2019 14:41)  BMI: 29.7 ( @ 00:19)  Change in Weight:  Vital Signs Last 24 Hrs  T(C): 37.8 (2019 05:21), Max: 37.8 (2019 16:10)  T(F): 100.1 (2019 05:21), Max: 100.1 (2019 16:10)  HR: 82 (2019 05:21) (77 - 86)  BP: 150/94 (2019 05:21) (128/89 - 150/94)  BP(mean): --  RR: 18 (2019 05:21) (16 - 18)  SpO2: 97% (2019 05:21) (93% - 99%)  I&O's Detail      PHYSICAL EXAM  General:  Well developed, well nourished, alert and active, no pallor, NAD.  HEENT:    Normal appearance of conjunctiva, ears, nose, lips, oropharynx, and oral mucosa, anicteric.  Neck:  No masses, no asymmetry.  Lymph Nodes:  No lymphadenopathy.   Cardiovascular:  RRR normal S1/S2, no murmur.  Respiratory:  CTA B/L, normal respiratory effort.   Abdominal:   soft, no masses LLQ tenderness, normoactive BS, NT/ND, no HSM.  Extremities:   No clubbing or cyanosis, normal capillary refill, no edema.   Skin:   No rash, jaundice, lesions, eczema.   Musculoskeletal:  No joint swelling, erythema or tenderness.   Other:     Lab Results:                        10.8   8.69  )-----------( 151      ( 2019 07:30 )             33.7     01-29    140  |  103  |  12  ----------------------------<  106<H>  3.7   |  31  |  0.72    Ca    8.3<L>      2019 07:30              Stool Results:          RADIOLOGY RESULTS:    SURGICAL PATHOLOGY:

## 2019-01-29 NOTE — CONSULT NOTE ADULT - ASSESSMENT
59 y/o male with HTN on coreg admitted on 1/26 for evaluation of one day history lower abdominal pain and constipation; patient was started empirically on zosyn when imaging revealed sigmoid diverticulitis; however he had persistent leukocytosis and antibiotics were changed to ertapenem; patient is tolerating clear diet but still has lower abdominal pain. Never had this happen to him before; has had bowel movement and feels somewhat improved since admission. No recent travel, sick contacts or any other complaints.  1. Patient admitted with acute sigmoid diverticulitis  - iv hydration and supportive care   - serial cbc and monitor temperature   - reviewed prior medical records to evaluate for resistant or atypical pathogens   - diet per surgery  - will optimize antibiotics to meropenem, with consideration to discharge on ertapenem should surgery feel that patient will benefit from iv antibiotics upon discharge  2. other issues; hypertension   - per medicine  Will follow

## 2019-01-29 NOTE — PROGRESS NOTE ADULT - SUBJECTIVE AND OBJECTIVE BOX
Reports some improvement in pain but nausea, no emesis. Passing flatus. Feels bloated. Low grade fevers to 100.1    Exam:  Vital Signs Last 24 Hrs  T(C): 37.8 (29 Jan 2019 05:21), Max: 37.8 (28 Jan 2019 16:10)  T(F): 100.1 (29 Jan 2019 05:21), Max: 100.1 (28 Jan 2019 16:10)  HR: 82 (29 Jan 2019 05:21) (77 - 86)  BP: 150/94 (29 Jan 2019 05:21) (128/89 - 150/94)  RR: 18 (29 Jan 2019 05:21) (16 - 18)  SpO2: 97% (29 Jan 2019 05:21) (93% - 99%)    In no distress but looks uncomfortable  Abdomen distended, tympanitic and tenderness in LLQ and lower midline area                          10.8   8.69  )-----------( 151      ( 29 Jan 2019 07:30 )             33.7   01-29    140  |  103  |  12  ----------------------------<  106<H>  3.7   |  31  |  0.72    Ca    8.3<L>      29 Jan 2019 07:30

## 2019-01-29 NOTE — PROGRESS NOTE ADULT - SUBJECTIVE AND OBJECTIVE BOX
CC: Abd pain	  History of Present Illness: 	  57 y/o male with HTN on coreg presents with 1 day h/o lower abd pain found to have Sig diverticulitis on CT.  Last BM 2 days ago.  Given 3L IVF and zosyn in ED.  Lactate 3-->3.6.  BP OK.   Pt was admitted for severe sepsis secondary to acute diverticulitis.  He is NPO, on IV abx and fluids with pain management.  He is comfortable at bedside but weak with mild abd discomfort on pain meds.  + fevers, no N/V.      1/28: Abd pain slowly improving.  Pt overall feeling better today but still weak, has abd discomfort.   1/29: Pt abd pain improving.  Has some wheezing but otherwise denies complaints.     REVIEW OF SYSTEMS: All other review of systems is negative unless indicated above.    Vital Signs Last 24 Hrs  T(C): 36.7 (29 Jan 2019 10:44), Max: 37.8 (28 Jan 2019 16:10)  T(F): 98.1 (29 Jan 2019 10:44), Max: 100.1 (28 Jan 2019 16:10)  HR: 82 (29 Jan 2019 10:44) (80 - 86)  BP: 134/84 (29 Jan 2019 10:44) (134/84 - 150/94)  BP(mean): --  RR: 17 (29 Jan 2019 10:44) (16 - 18)  SpO2: 93% (29 Jan 2019 10:44) (93% - 97%)    PHYSICAL EXAM:    Constitutional: NAD, awake and alert  HEENT: PERR, EOMI, Normal Hearing, MMM  Neck: Soft and supple  Respiratory: B/l wheezing  Cardiovascular: S1 and S2, regular rate and rhythm, no Murmurs, gallops or rubs  Gastrointestinal: Bowel Sounds present, + LLQ tenderness --> improving  Extremities: No peripheral edema  Neurological: A/O x 3, no focal deficits in my limited exam  Skin: No rashes    MEDICATIONS  (STANDING):  carvedilol 12.5 milliGRAM(s) Oral every 12 hours  ertapenem  IVPB      ertapenem  IVPB 1000 milliGRAM(s) IV Intermittent every 24 hours  heparin  Injectable 5000 Unit(s) SubCutaneous every 8 hours    MEDICATIONS  (PRN):  acetaminophen   Tablet .. 650 milliGRAM(s) Oral every 6 hours PRN Temp greater or equal to 38.5C (101.3F), Moderate Pain (4 - 6)  ALBUTerol/ipratropium for Nebulization 3 milliLiter(s) Nebulizer every 8 hours PRN Shortness of Breath and/or Wheezing  ketorolac   Injectable 15 milliGRAM(s) IV Push every 8 hours PRN Severe Pain (7 - 10)  oxyCODONE    IR 5 milliGRAM(s) Oral every 4 hours PRN Moderate Pain (4 - 6)                              10.8   8.69  )-----------( 151      ( 29 Jan 2019 07:30 )             33.7     01-29    140  |  103  |  12  ----------------------------<  106<H>  3.7   |  31  |  0.72    Ca    8.3<L>      29 Jan 2019 07:30      CAPILLARY BLOOD GLUCOSE      Assessment and Plan:  57 y/o male with HTN presents with 1 day h/o lower abd pain found to have diverticulitis.    Severe sepsis secondary to acute Diverticulitis:  -sepsis resolving  -CT suggestive of prox sigmoid diverticulitis   -pip/tazo changed to ertapenem per colorectal, f/u infectious disease recs.  -d/c IVFs  -advance diet per GI/surgery  -Pain control changed to toradol, oxy   -Surgical and GI evaluation appreciated    Asthma:  -duoneb  -incentive spirometry for atelectasis seen on chest xray    Acute renal failure: secondary to sepsis: resolved    HTN  -cont with Coreg     DVT prophy  -sqhep CC: Abd pain	  History of Present Illness: 	  59 y/o male with HTN on coreg presents with 1 day h/o lower abd pain found to have Sig diverticulitis on CT.  Last BM 2 days ago.  Given 3L IVF and zosyn in ED.  Lactate 3-->3.6.  BP OK.   Pt was admitted for severe sepsis secondary to acute diverticulitis.  He is NPO, on IV abx and fluids with pain management.  He is comfortable at bedside but weak with mild abd discomfort on pain meds.  + fevers, no N/V.      1/28: Abd pain slowly improving.  Pt overall feeling better today but still weak, has abd discomfort.   1/29: Pt abd pain improving.  Has some wheezing but otherwise denies complaints.     REVIEW OF SYSTEMS: All other review of systems is negative unless indicated above.    Vital Signs Last 24 Hrs  T(C): 36.7 (29 Jan 2019 10:44), Max: 37.8 (28 Jan 2019 16:10)  T(F): 98.1 (29 Jan 2019 10:44), Max: 100.1 (28 Jan 2019 16:10)  HR: 82 (29 Jan 2019 10:44) (80 - 86)  BP: 134/84 (29 Jan 2019 10:44) (134/84 - 150/94)  BP(mean): --  RR: 17 (29 Jan 2019 10:44) (16 - 18)  SpO2: 93% (29 Jan 2019 10:44) (93% - 97%)    PHYSICAL EXAM:    Constitutional: NAD, awake and alert  HEENT: PERR, EOMI, Normal Hearing, MMM  Neck: Soft and supple  Respiratory: B/l wheezing  Cardiovascular: S1 and S2, regular rate and rhythm, no Murmurs, gallops or rubs  Gastrointestinal: Bowel Sounds present, + LLQ tenderness --> improving  Extremities: No peripheral edema  Neurological: A/O x 3, no focal deficits in my limited exam  Skin: No rashes    MEDICATIONS  (STANDING):  carvedilol 12.5 milliGRAM(s) Oral every 12 hours  ertapenem  IVPB      ertapenem  IVPB 1000 milliGRAM(s) IV Intermittent every 24 hours  heparin  Injectable 5000 Unit(s) SubCutaneous every 8 hours    MEDICATIONS  (PRN):  acetaminophen   Tablet .. 650 milliGRAM(s) Oral every 6 hours PRN Temp greater or equal to 38.5C (101.3F), Moderate Pain (4 - 6)  ALBUTerol/ipratropium for Nebulization 3 milliLiter(s) Nebulizer every 8 hours PRN Shortness of Breath and/or Wheezing  ketorolac   Injectable 15 milliGRAM(s) IV Push every 8 hours PRN Severe Pain (7 - 10)  oxyCODONE    IR 5 milliGRAM(s) Oral every 4 hours PRN Moderate Pain (4 - 6)                              10.8   8.69  )-----------( 151      ( 29 Jan 2019 07:30 )             33.7     01-29    140  |  103  |  12  ----------------------------<  106<H>  3.7   |  31  |  0.72    Ca    8.3<L>      29 Jan 2019 07:30      CAPILLARY BLOOD GLUCOSE      Assessment and Plan:  59 y/o male with HTN presents with 1 day h/o lower abd pain found to have diverticulitis.    Severe sepsis secondary to acute Diverticulitis:  -sepsis resolving  -CT suggestive of prox sigmoid diverticulitis   -pip/tazo changed to ertapenem per colorectal, f/u infectious disease recs.  -d/c IVFs  -advance diet per GI/surgery  -Pain control changed to toradol, oxy   -Surgical and GI evaluation appreciated - repeat CT today    Asthma:  -duoneb  -incentive spirometry for atelectasis seen on chest xray    Acute renal failure: secondary to sepsis: resolved    HTN  -cont with Coreg     DVT prophy  -sqhep

## 2019-01-29 NOTE — CONSULT NOTE ADULT - SUBJECTIVE AND OBJECTIVE BOX
Patient is a 58y old  Male who presents with a chief complaint of Abd pain (2019 13:49)    HPI:  57 y/o male with HTN on coreg admitted on  for evaluation of one day history lower abdominal pain and constipation; patient was started empirically on zosyn when imaging revealed sigmoid diverticulitis; however he had persistent leukocytosis and antibiotics were changed to ertapenem; patient is tolerating clear diet but still has lower abdominal pain. Never had this happen to him before; has had bowel movement and feels somewhat improved since admission. No recent travel, sick contacts or any other complaints.        PMH: as above  PSH: as above  Meds: per reconciliation sheet, noted below  MEDICATIONS  (STANDING):  carvedilol 12.5 milliGRAM(s) Oral every 12 hours  heparin  Injectable 5000 Unit(s) SubCutaneous every 8 hours  meropenem  IVPB 1000 milliGRAM(s) IV Intermittent every 8 hours    MEDICATIONS  (PRN):  acetaminophen   Tablet .. 650 milliGRAM(s) Oral every 6 hours PRN Temp greater or equal to 38.5C (101.3F), Moderate Pain (4 - 6)  ALBUTerol/ipratropium for Nebulization 3 milliLiter(s) Nebulizer every 8 hours PRN Shortness of Breath and/or Wheezing  ketorolac   Injectable 15 milliGRAM(s) IV Push every 8 hours PRN Severe Pain (7 - 10)  oxyCODONE    IR 5 milliGRAM(s) Oral every 4 hours PRN Moderate Pain (4 - 6)    Allergies    No Known Allergies    Intolerances      Social: no smoking, no alcohol, no illegal drugs; no recent travel, no exposure to TB  FAMILY HISTORY:  No pertinent family history in first degree relatives     no history of premature cardiovascular disease in first degree relatives  ROS: the patient denies fever, no chills, no HA, no dizziness, no sore throat, no blurry vision, no CP, no palpitations, no SOB, no cough,  no diarrhea, no N/V, no dysuria, no leg pain, no claudication, no rash, no joint aches, no rectal pain or bleeding, no night sweats  All other systems reviewed and are negative    Vital Signs Last 24 Hrs  T(C): 36.7 (2019 10:44), Max: 37.8 (2019 16:10)  T(F): 98.1 (2019 10:44), Max: 100.1 (2019 16:10)  HR: 82 (2019 10:44) (80 - 86)  BP: 134/84 (2019 10:44) (134/84 - 150/94)  BP(mean): --  RR: 17 (2019 10:44) (16 - 18)  SpO2: 93% (2019 10:44) (93% - 97%)  Daily     Daily Weight in k.8 (2019 14:41)    PE:    Constitutional: frail looking  HEENT: NC/AT, EOMI, PERRLA, conjunctivae clear; ears and nose atraumatic; pharynx clear  Neck: supple; thyroid not palpable  Back: no tenderness  Respiratory: respiratory effort normal; clear to auscultation  Cardiovascular: S1S2 regular, no murmurs  Abdomen: soft, mild lower quadrant tender, not distended, positive BS; no liver or spleen organomegaly  Genitourinary: no suprapubic tenderness  Musculoskeletal: no muscle tenderness, no joint swelling or tenderness  Neurological/ Psychiatric: AxOx3, judgement and insight normal;  moving all extremities  Skin: no rashes; no palpable lesions    Labs: all available labs reviewed                        10.8   8.69  )-----------( 151      ( 2019 07:30 )             33.7         140  |  103  |  12  ----------------------------<  106<H>  3.7   |  31  |  0.72    Ca    8.3<L>      2019 07:30       < from: CT Abdomen and Pelvis w/ Oral Cont and w/ IV Cont (19 @ 03:07) >    EXAM:  CT ABDOMEN AND PELVIS OC IC                            PROCEDURE DATE:  2019          INTERPRETATION:  CT ABDOMEN AND PELVIS DATED 2018.    CLINICAL INFORMATION:  Diffuse abdominal pain.    TECHNIQUE:  Axial CT images through the abdomen and pelvis are acquired   with administration of intravenous contrast. Images are reformatted in   the sagittal and coronal planes. 90cc of Omnipaque 350 were administered   without event.  10cc were discarded.    No prior studies are available for comparison.    FINDINGS:    There is bibasilar dependent and platelike atelectasis.    The liver, gallbladder, biliary tree, pancreas, spleen, adrenal glands,   and kidneys are unremarkable apart from left renal parapelvic cysts. The   urinary bladder is collapsed. The prostate gland is not enlarged.    The stomach is mildly distended. There is no bowel obstruction. There is   descending and sigmoid colonic diverticulosis with short segment focal   thickening of the proximal sigmoid colon about an inflamed diverticulum.   There are surrounding inflammatory changes and small volume of complex   fluid tracking into the pelvis. A normal appendix is identified. There is   no pneumoperitoneum or loculated collection to suggest abscess.    There is no significant abdominal or pelvic lymphadenopathy. There is a   subcentimeter calcified periportal lymph node. There is mild   atherosclerotic calcification of the aortoiliac tree. The abdominal aorta   is normal in caliber.    There is a smallfat-containing right inguinal hernia.    The osseous structures are unremarkable apart from multilevel   degenerative changes of the spine and mild degenerative changes of the   hips.      IMPRESSION:    Acute proximal sigmoid diverticulitis with surrounding inflammatory   change and small volume of complex fluid tracking into the pelvis. No   extraluminal free air or loculated collection to suggest abscess.      < end of copied text >          Radiology: all available radiological tests reviewed    Advanced directives addressed: full resuscitation

## 2019-01-29 NOTE — PROGRESS NOTE ADULT - SUBJECTIVE AND OBJECTIVE BOX
CT scan today reviewed. Worsening collections from admission. Will make pt NPO at midnight and discuss with IR in the am for possible drainage.

## 2019-01-29 NOTE — PROGRESS NOTE ADULT - ASSESSMENT
58 year old male with diverticulitis. Still with low grade fevers and abdominal exam with tenderness and distension. CT scan ordered by Dr. Lanza and will follow up on findings

## 2019-01-30 LAB
ANION GAP SERPL CALC-SCNC: 7 MMOL/L — SIGNIFICANT CHANGE UP (ref 5–17)
APTT BLD: 27.2 SEC — LOW (ref 27.5–36.3)
BUN SERPL-MCNC: 9 MG/DL — SIGNIFICANT CHANGE UP (ref 7–23)
CALCIUM SERPL-MCNC: 8 MG/DL — LOW (ref 8.5–10.1)
CHLORIDE SERPL-SCNC: 102 MMOL/L — SIGNIFICANT CHANGE UP (ref 96–108)
CO2 SERPL-SCNC: 29 MMOL/L — SIGNIFICANT CHANGE UP (ref 22–31)
CREAT SERPL-MCNC: 0.59 MG/DL — SIGNIFICANT CHANGE UP (ref 0.5–1.3)
GLUCOSE SERPL-MCNC: 111 MG/DL — HIGH (ref 70–99)
HCT VFR BLD CALC: 34.9 % — LOW (ref 39–50)
HGB BLD-MCNC: 11.4 G/DL — LOW (ref 13–17)
INR BLD: 1.33 RATIO — HIGH (ref 0.88–1.16)
MAGNESIUM SERPL-MCNC: 1.8 MG/DL — SIGNIFICANT CHANGE UP (ref 1.6–2.6)
MCHC RBC-ENTMCNC: 30.5 PG — SIGNIFICANT CHANGE UP (ref 27–34)
MCHC RBC-ENTMCNC: 32.7 GM/DL — SIGNIFICANT CHANGE UP (ref 32–36)
MCV RBC AUTO: 93.3 FL — SIGNIFICANT CHANGE UP (ref 80–100)
NRBC # BLD: 0 /100 WBCS — SIGNIFICANT CHANGE UP (ref 0–0)
PHOSPHATE SERPL-MCNC: 2.3 MG/DL — LOW (ref 2.5–4.5)
PLATELET # BLD AUTO: 143 K/UL — LOW (ref 150–400)
POTASSIUM SERPL-MCNC: 3.4 MMOL/L — LOW (ref 3.5–5.3)
POTASSIUM SERPL-SCNC: 3.4 MMOL/L — LOW (ref 3.5–5.3)
PROTHROM AB SERPL-ACNC: 14.9 SEC — HIGH (ref 10–12.9)
RBC # BLD: 3.74 M/UL — LOW (ref 4.2–5.8)
RBC # FLD: 14.4 % — SIGNIFICANT CHANGE UP (ref 10.3–14.5)
SODIUM SERPL-SCNC: 138 MMOL/L — SIGNIFICANT CHANGE UP (ref 135–145)
WBC # BLD: 6.61 K/UL — SIGNIFICANT CHANGE UP (ref 3.8–10.5)
WBC # FLD AUTO: 6.61 K/UL — SIGNIFICANT CHANGE UP (ref 3.8–10.5)

## 2019-01-30 PROCEDURE — 49406 IMAGE CATH FLUID PERI/RETRO: CPT

## 2019-01-30 RX ORDER — ZOLPIDEM TARTRATE 10 MG/1
5 TABLET ORAL ONCE
Qty: 0 | Refills: 0 | Status: DISCONTINUED | OUTPATIENT
Start: 2019-01-30 | End: 2019-01-30

## 2019-01-30 RX ORDER — DEXTROSE MONOHYDRATE, SODIUM CHLORIDE, AND POTASSIUM CHLORIDE 50; .745; 4.5 G/1000ML; G/1000ML; G/1000ML
1000 INJECTION, SOLUTION INTRAVENOUS
Qty: 0 | Refills: 0 | Status: DISCONTINUED | OUTPATIENT
Start: 2019-01-30 | End: 2019-01-30

## 2019-01-30 RX ADMIN — HEPARIN SODIUM 5000 UNIT(S): 5000 INJECTION INTRAVENOUS; SUBCUTANEOUS at 21:17

## 2019-01-30 RX ADMIN — CARVEDILOL PHOSPHATE 12.5 MILLIGRAM(S): 80 CAPSULE, EXTENDED RELEASE ORAL at 05:13

## 2019-01-30 RX ADMIN — MEROPENEM 100 MILLIGRAM(S): 1 INJECTION INTRAVENOUS at 13:05

## 2019-01-30 RX ADMIN — OXYCODONE HYDROCHLORIDE 5 MILLIGRAM(S): 5 TABLET ORAL at 20:21

## 2019-01-30 RX ADMIN — MEROPENEM 100 MILLIGRAM(S): 1 INJECTION INTRAVENOUS at 05:13

## 2019-01-30 RX ADMIN — DEXTROSE MONOHYDRATE, SODIUM CHLORIDE, AND POTASSIUM CHLORIDE 50 MILLILITER(S): 50; .745; 4.5 INJECTION, SOLUTION INTRAVENOUS at 12:10

## 2019-01-30 RX ADMIN — CARVEDILOL PHOSPHATE 12.5 MILLIGRAM(S): 80 CAPSULE, EXTENDED RELEASE ORAL at 19:13

## 2019-01-30 RX ADMIN — PANTOPRAZOLE SODIUM 40 MILLIGRAM(S): 20 TABLET, DELAYED RELEASE ORAL at 12:10

## 2019-01-30 RX ADMIN — MEROPENEM 100 MILLIGRAM(S): 1 INJECTION INTRAVENOUS at 21:18

## 2019-01-30 RX ADMIN — OXYCODONE HYDROCHLORIDE 5 MILLIGRAM(S): 5 TABLET ORAL at 22:02

## 2019-01-30 RX ADMIN — ZOLPIDEM TARTRATE 5 MILLIGRAM(S): 10 TABLET ORAL at 22:02

## 2019-01-30 NOTE — BRIEF OPERATIVE NOTE - COMMENTS
1) Flush 10ccNS BID  2) Follow Cx  3) Consider removal when output <10-20cc/24hr with clinical improvement  4) Repeat CT if concern for residual  5) Drain check in IR if concern for bowel fistula

## 2019-01-30 NOTE — PROGRESS NOTE ADULT - SUBJECTIVE AND OBJECTIVE BOX
Reports improvement today. Had multiple bowel movements. Denies abdominal distension, nausea or emesis. Still having low grade fevers. Abdominal pain improved    Exam:  Vital Signs Last 24 Hrs  T(C): 37.4 (30 Jan 2019 04:32), Max: 38.1 (29 Jan 2019 18:33)  T(F): 99.4 (30 Jan 2019 04:32), Max: 100.5 (29 Jan 2019 18:33)  HR: 81 (30 Jan 2019 04:32) (78 - 84)  BP: 159/97 (30 Jan 2019 04:32) (131/88 - 159/97)  RR: 18 (30 Jan 2019 04:32) (16 - 18)  SpO2: 95% (30 Jan 2019 04:32) (93% - 95%)    In no distress  Abdomen: soft and improved distension. Tender in LLQ but also improved from yesterday  Alert and oriented x 3                          11.4   6.61  )-----------( 143      ( 30 Jan 2019 06:48 )             34.9   01-30    138  |  102  |  9   ----------------------------<  111<H>  3.4<L>   |  29  |  0.59    Ca    8.0<L>      30 Jan 2019 06:48  Phos  2.3     01-30  Mg     1.8     01-30 Reports improvement today. Had multiple bowel movements. Denies abdominal distension, nausea or emesis. Still having low grade fevers. Abdominal pain improved    Exam:  Vital Signs Last 24 Hrs  T(C): 37.4 (30 Jan 2019 04:32), Max: 38.1 (29 Jan 2019 18:33)  T(F): 99.4 (30 Jan 2019 04:32), Max: 100.5 (29 Jan 2019 18:33)  HR: 81 (30 Jan 2019 04:32) (78 - 84)  BP: 159/97 (30 Jan 2019 04:32) (131/88 - 159/97)  RR: 18 (30 Jan 2019 04:32) (16 - 18)  SpO2: 95% (30 Jan 2019 04:32) (93% - 95%)    In no distress  Abdomen: soft and improved distension. Tender in LLQ but also improved from yesterday  Alert and oriented x 3                          11.4   6.61  )-----------( 143      ( 30 Jan 2019 06:48 )             34.9   01-30    138  |  102  |  9   ----------------------------<  111<H>  3.4<L>   |  29  |  0.59    Ca    8.0<L>      30 Jan 2019 06:48  Phos  2.3     01-30  Mg     1.8     01-30    < from: CT Abdomen and Pelvis w/ Oral Cont (01.29.19 @ 12:13) >    EXAM:  CT ABDOMEN AND PELVIS OC                          PROCEDURE DATE:  01/29/2019      INTERPRETATION:  CLINICAL INFORMATION: Diverticulitis, evaluate course of disease.    COMPARISON: None.    PROCEDURE:   CT of the Abdomen and Pelvis was performed without intravenous contrast.   Intravenous contrast: None.  Oral contrast: positive contrast was administered.  Sagittal and coronal reformats were performed.    FINDINGS:    LOWER CHEST: 6 mm nodule along the right minor fissure likely reflects and parenchymal lymph node. Small bilateral pleural effusions. Cardiomegaly.    LIVER: Within normal limits.  BILE DUCTS: Normal caliber.  GALLBLADDER: High density material within the lumen may reflect excreted contrast related to prior study.  SPLEEN: Mildly enlarged.  PANCREAS: Within normal limits.  ADRENALS: Within normal limits.  KIDNEYS/URETERS: Within normal limits. Left parapelvic cysts.    BLADDER: Under distended and thick-walled.  REPRODUCTIVE ORGANS: Within normal limits.     BOWEL: No bowel obstruction. Colonic diverticulosis with wall thickening and inflammatory stranding around the sigmoid colon compatible with diverticulitis. There is a fluid and gas containing 2.1 x 2.8 cm collection abutting the proximalsigmoid colonic wall (2-1 08) which is increased in size and complexity compared to prior study and is compatible with small abscess. Tubular 4.6 x 1.4 cm loculated fluid additionally tracks superiorly from this region and may reflect   phlegmon/developing abscess (2-97). There is a new 7.1 x 3.9 cm air and fluid collection abutting several small bowel loops in the left abdomen with associated marked wall thickening of these loops (2-86).   PERITONEUM: Small complex ascites.  VESSELS:  Within normal limits.  LYMPH NODES: No lymphadenopathy.    ABDOMINAL WALL: Mild anasarca.  BONES: No suspicious osseous lesion.  OTHER:  None                                                                 IMPRESSION:   Sigmoid diverticulitis with new pericolonic abscess and larger abscess in the left abdomen adjacent to small bowel loops. Tubular loculated fluid collection tracking from the sigmoid colon may reflect an additional developing abscess. Evaluation is somewhat limited without intravenous   contrast.    < end of copied text >

## 2019-01-30 NOTE — PROGRESS NOTE ADULT - SUBJECTIVE AND OBJECTIVE BOX
CC: Abd pain	  History of Present Illness: 	  57 y/o male with HTN on coreg presents with 1 day h/o lower abd pain found to have Sig diverticulitis on CT.  Last BM 2 days ago.  Given 3L IVF and zosyn in ED.  Lactate 3-->3.6.  BP OK.   Pt was admitted for severe sepsis secondary to acute diverticulitis.  He is NPO, on IV abx and fluids with pain management.  He is comfortable at bedside but weak with mild abd discomfort on pain meds.  + fevers, no N/V.      1/28: Abd pain slowly improving.  Pt overall feeling better today but still weak, has abd discomfort.   1/29: Pt abd pain improving.  Has some wheezing but otherwise denies complaints.   1/30: Pt with persistent fevers.  Abd pain subsided but still present.  Repeat CT suggestive of abscess formation.    REVIEW OF SYSTEMS: All other review of systems is negative unless indicated above.    Vital Signs Last 24 Hrs  T(C): 36.7 (30 Jan 2019 11:15), Max: 38.1 (29 Jan 2019 18:33)  T(F): 98.1 (30 Jan 2019 11:15), Max: 100.5 (29 Jan 2019 18:33)  HR: 79 (30 Jan 2019 11:15) (79 - 84)  BP: 145/97 (30 Jan 2019 11:15) (131/88 - 159/97)  BP(mean): --  RR: 17 (30 Jan 2019 11:15) (16 - 18)  SpO2: 95% (30 Jan 2019 11:15) (94% - 95%)    PHYSICAL EXAM:    Constitutional: NAD, awake and alert  HEENT: PERR, EOMI, Normal Hearing, MMM  Neck: Soft and supple  Respiratory: B/l wheezing  Cardiovascular: S1 and S2, regular rate and rhythm, no Murmurs, gallops or rubs  Gastrointestinal: Bowel Sounds present, + LLQ tenderness --> improving  Extremities: No peripheral edema  Neurological: A/O x 3, no focal deficits in my limited exam  Skin: No rashes    MEDICATIONS  (STANDING):  carvedilol 12.5 milliGRAM(s) Oral every 12 hours  dextrose 5% + sodium chloride 0.9% with potassium chloride 20 mEq/L 1000 milliLiter(s) (50 mL/Hr) IV Continuous <Continuous>  heparin  Injectable 5000 Unit(s) SubCutaneous every 8 hours  meropenem  IVPB 1000 milliGRAM(s) IV Intermittent every 8 hours  pantoprazole  Injectable 40 milliGRAM(s) IV Push daily    MEDICATIONS  (PRN):  acetaminophen   Tablet .. 650 milliGRAM(s) Oral every 6 hours PRN Temp greater or equal to 38.5C (101.3F), Moderate Pain (4 - 6)  ALBUTerol/ipratropium for Nebulization 3 milliLiter(s) Nebulizer every 8 hours PRN Shortness of Breath and/or Wheezing  ketorolac   Injectable 15 milliGRAM(s) IV Push every 8 hours PRN Severe Pain (7 - 10)  oxyCODONE    IR 5 milliGRAM(s) Oral every 4 hours PRN Moderate Pain (4 - 6)                              11.4   6.61  )-----------( 143      ( 30 Jan 2019 06:48 )             34.9     01-30    138  |  102  |  9   ----------------------------<  111<H>  3.4<L>   |  29  |  0.59    Ca    8.0<L>      30 Jan 2019 06:48  Phos  2.3     01-30  Mg     1.8     01-30      CAPILLARY BLOOD GLUCOSE    PT/INR - ( 30 Jan 2019 06:48 )   PT: 14.9 sec;   INR: 1.33 ratio       PTT - ( 30 Jan 2019 06:48 )  PTT:27.2 sec      Assessment and Plan:  57 y/o male with HTN presents with 1 day h/o lower abd pain found to have diverticulitis.    Severe sepsis secondary to acute Diverticulitis w/ abscess formation:  -sepsis present  -CT suggestive of prox sigmoid diverticulitis w/ new abscess formation  -pip/tazo changed to ertapenem per colorectal, f/u infectious disease recs.  -IVFs while NPO  -Pain control   -IR guided drainage    Asthma:  -duoneb  -incentive spirometry for atelectasis seen on chest xray    Acute renal failure: secondary to sepsis: resolved    HTN  -cont with Coreg     DVT prophy  -sqhep

## 2019-01-30 NOTE — PROGRESS NOTE ADULT - ASSESSMENT
57 y/o male with HTN on coreg admitted on 1/26 for evaluation of one day history lower abdominal pain and constipation; patient was started empirically on zosyn when imaging revealed sigmoid diverticulitis; however he had persistent leukocytosis and antibiotics were changed to ertapenem; patient is tolerating clear diet but still has lower abdominal pain. Never had this happen to him before; has had bowel movement and feels somewhat improved since admission. No recent travel, sick contacts or any other complaints.  1. Patient admitted with acute sigmoid diverticulitis  - iv hydration and supportive care   - serial cbc and monitor temperature   - reviewed prior medical records to evaluate for resistant or atypical pathogens   - diet per surgery  - day #2 meropenem  - tolerating antibiotics without rashes or side effects   - to have IR evaluation to decide on drainage  - most likely will require iv antibiotics upon discharge  2. other issues; hypertension   - per medicine  Will follow

## 2019-01-30 NOTE — BRIEF OPERATIVE NOTE - OPERATION/FINDINGS
1) IV contrast injection showed large L upper abscess, small L lower abscess along lateral abd  2) Access of both under CT guidance with yellow pus aspirated  3) 14F drain placed in lower collection, 10cc aspirated in total  4) 12F drain placed in upper collection, 60cc aspirated in total

## 2019-01-30 NOTE — PROGRESS NOTE ADULT - ASSESSMENT
58 year old male with complicated diverticulitis. His WBC has normalized and his abdominal exam improved. However, CT scan yesterday with collections.    NPO for now with IR consult for possible percutaneous drainage  IV antibiotics per ID  May resume clears if procedure done today, gentle IV hydration till then  Ambulate and IS  VTE prophylaxis     Ultimately, recommendation would be for surgical resection for complicated diverticulitis. Ideally, this would be done electively to decrease chance of a stoma. Would likely need prolonged IV antibiotics prior to elective surgery.

## 2019-01-30 NOTE — PROGRESS NOTE ADULT - SUBJECTIVE AND OBJECTIVE BOX
Patient is a 58y old  Male who presents with a chief complaint of Abd pain (29 Jan 2019 13:49)      Date of service: 01-30-19 @ 12:09      Patient still with intermittent fever, still with lower abdominal pain        ROS: no fever or chills; denies dizziness, no HA, no SOB or cough,  no diarrhea or constipation; no dysuria, no urinary frequency, no legs pain, no rashes    MEDICATIONS  (STANDING):  carvedilol 12.5 milliGRAM(s) Oral every 12 hours  dextrose 5% + sodium chloride 0.9% with potassium chloride 20 mEq/L 1000 milliLiter(s) (50 mL/Hr) IV Continuous <Continuous>  heparin  Injectable 5000 Unit(s) SubCutaneous every 8 hours  meropenem  IVPB 1000 milliGRAM(s) IV Intermittent every 8 hours  pantoprazole  Injectable 40 milliGRAM(s) IV Push daily    MEDICATIONS  (PRN):  acetaminophen   Tablet .. 650 milliGRAM(s) Oral every 6 hours PRN Temp greater or equal to 38.5C (101.3F), Moderate Pain (4 - 6)  ALBUTerol/ipratropium for Nebulization 3 milliLiter(s) Nebulizer every 8 hours PRN Shortness of Breath and/or Wheezing  ketorolac   Injectable 15 milliGRAM(s) IV Push every 8 hours PRN Severe Pain (7 - 10)  oxyCODONE    IR 5 milliGRAM(s) Oral every 4 hours PRN Moderate Pain (4 - 6)      Vital Signs Last 24 Hrs  T(C): 36.7 (30 Jan 2019 11:15), Max: 38.1 (29 Jan 2019 18:33)  T(F): 98.1 (30 Jan 2019 11:15), Max: 100.5 (29 Jan 2019 18:33)  HR: 79 (30 Jan 2019 11:15) (79 - 84)  BP: 145/97 (30 Jan 2019 11:15) (131/88 - 159/97)  BP(mean): --  RR: 17 (30 Jan 2019 11:15) (16 - 18)  SpO2: 95% (30 Jan 2019 11:15) (94% - 95%)    Physical Exam:        PE:    Constitutional: frail looking  HEENT: NC/AT, EOMI, PERRLA, conjunctivae clear; ears and nose atraumatic; pharynx clear  Neck: supple; thyroid not palpable  Back: no tenderness  Respiratory: respiratory effort normal; clear to auscultation  Cardiovascular: S1S2 regular, no murmurs  Abdomen: soft, mild lower quadrant tender, not distended, positive BS; no liver or spleen organomegaly  Genitourinary: no suprapubic tenderness  Musculoskeletal: no muscle tenderness, no joint swelling or tenderness  Neurological/ Psychiatric: AxOx3, judgement and insight normal;  moving all extremities  Skin: no rashes; no palpable lesions    Labs: all available labs reviewed                         Labs:                        11.4   6.61  )-----------( 143      ( 30 Jan 2019 06:48 )             34.9     01-30    138  |  102  |  9   ----------------------------<  111<H>  3.4<L>   |  29  |  0.59    Ca    8.0<L>      30 Jan 2019 06:48  Phos  2.3     01-30  Mg     1.8     01-30             Cultures:       Culture - Urine (collected 01-26-19 @ 11:25)  Source: .Urine Clean Catch (Midstream)  Final Report (01-27-19 @ 23:11):    No growth                 < from: CT Abdomen and Pelvis w/ Oral Cont and w/ IV Cont (01.26.19 @ 03:07) >    EXAM:  CT ABDOMEN AND PELVIS OC IC                            PROCEDURE DATE:  01/26/2019          INTERPRETATION:  CT ABDOMEN AND PELVIS DATED 1/26/2018.    CLINICAL INFORMATION:  Diffuse abdominal pain.    TECHNIQUE:  Axial CT images through the abdomen and pelvis are acquired   with administration of intravenous contrast. Images are reformatted in   the sagittal and coronal planes. 90cc of Omnipaque 350 were administered   without event.  10cc were discarded.    No prior studies are available for comparison.    FINDINGS:    There is bibasilar dependent and platelike atelectasis.    The liver, gallbladder, biliary tree, pancreas, spleen, adrenal glands,   and kidneys are unremarkable apart from left renal parapelvic cysts. The   urinary bladder is collapsed. The prostate gland is not enlarged.    The stomach is mildly distended. There is no bowel obstruction. There is   descending and sigmoid colonic diverticulosis with short segment focal   thickening of the proximal sigmoid colon about an inflamed diverticulum.   There are surrounding inflammatory changes and small volume of complex   fluid tracking into the pelvis. A normal appendix is identified. There is   no pneumoperitoneum or loculated collection to suggest abscess.    There is no significant abdominal or pelvic lymphadenopathy. There is a   subcentimeter calcified periportal lymph node. There is mild   atherosclerotic calcification of the aortoiliac tree. The abdominal aorta   is normal in caliber.    There is a smallfat-containing right inguinal hernia.    The osseous structures are unremarkable apart from multilevel   degenerative changes of the spine and mild degenerative changes of the   hips.      IMPRESSION:    Acute proximal sigmoid diverticulitis with surrounding inflammatory   change and small volume of complex fluid tracking into the pelvis. No   extraluminal free air or loculated collection to suggest abscess.      < end of copied text >          Radiology: all available radiological tests reviewed    Advanced directives addressed: full resuscitation

## 2019-01-31 LAB
ANION GAP SERPL CALC-SCNC: 6 MMOL/L — SIGNIFICANT CHANGE UP (ref 5–17)
BUN SERPL-MCNC: 6 MG/DL — LOW (ref 7–23)
CALCIUM SERPL-MCNC: 7.9 MG/DL — LOW (ref 8.5–10.1)
CHLORIDE SERPL-SCNC: 103 MMOL/L — SIGNIFICANT CHANGE UP (ref 96–108)
CO2 SERPL-SCNC: 30 MMOL/L — SIGNIFICANT CHANGE UP (ref 22–31)
CREAT SERPL-MCNC: 0.71 MG/DL — SIGNIFICANT CHANGE UP (ref 0.5–1.3)
GLUCOSE SERPL-MCNC: 155 MG/DL — HIGH (ref 70–99)
HCT VFR BLD CALC: 35.8 % — LOW (ref 39–50)
HGB BLD-MCNC: 11.6 G/DL — LOW (ref 13–17)
MCHC RBC-ENTMCNC: 30.2 PG — SIGNIFICANT CHANGE UP (ref 27–34)
MCHC RBC-ENTMCNC: 32.4 GM/DL — SIGNIFICANT CHANGE UP (ref 32–36)
MCV RBC AUTO: 93.2 FL — SIGNIFICANT CHANGE UP (ref 80–100)
NRBC # BLD: 0 /100 WBCS — SIGNIFICANT CHANGE UP (ref 0–0)
PLATELET # BLD AUTO: 175 K/UL — SIGNIFICANT CHANGE UP (ref 150–400)
POTASSIUM SERPL-MCNC: 3.3 MMOL/L — LOW (ref 3.5–5.3)
POTASSIUM SERPL-SCNC: 3.3 MMOL/L — LOW (ref 3.5–5.3)
RBC # BLD: 3.84 M/UL — LOW (ref 4.2–5.8)
RBC # FLD: 14.5 % — SIGNIFICANT CHANGE UP (ref 10.3–14.5)
SODIUM SERPL-SCNC: 139 MMOL/L — SIGNIFICANT CHANGE UP (ref 135–145)
WBC # BLD: 6.2 K/UL — SIGNIFICANT CHANGE UP (ref 3.8–10.5)
WBC # FLD AUTO: 6.2 K/UL — SIGNIFICANT CHANGE UP (ref 3.8–10.5)

## 2019-01-31 RX ORDER — POTASSIUM CHLORIDE 20 MEQ
20 PACKET (EA) ORAL ONCE
Qty: 0 | Refills: 0 | Status: COMPLETED | OUTPATIENT
Start: 2019-01-31 | End: 2019-01-31

## 2019-01-31 RX ORDER — POTASSIUM CHLORIDE 20 MEQ
40 PACKET (EA) ORAL EVERY 4 HOURS
Qty: 0 | Refills: 0 | Status: COMPLETED | OUTPATIENT
Start: 2019-01-31 | End: 2019-01-31

## 2019-01-31 RX ADMIN — Medication 650 MILLIGRAM(S): at 20:47

## 2019-01-31 RX ADMIN — CARVEDILOL PHOSPHATE 12.5 MILLIGRAM(S): 80 CAPSULE, EXTENDED RELEASE ORAL at 04:57

## 2019-01-31 RX ADMIN — OXYCODONE HYDROCHLORIDE 5 MILLIGRAM(S): 5 TABLET ORAL at 21:04

## 2019-01-31 RX ADMIN — Medication 40 MILLIEQUIVALENT(S): at 12:22

## 2019-01-31 RX ADMIN — MEROPENEM 100 MILLIGRAM(S): 1 INJECTION INTRAVENOUS at 04:56

## 2019-01-31 RX ADMIN — CARVEDILOL PHOSPHATE 12.5 MILLIGRAM(S): 80 CAPSULE, EXTENDED RELEASE ORAL at 17:14

## 2019-01-31 RX ADMIN — HEPARIN SODIUM 5000 UNIT(S): 5000 INJECTION INTRAVENOUS; SUBCUTANEOUS at 04:56

## 2019-01-31 RX ADMIN — Medication 20 MILLIEQUIVALENT(S): at 10:42

## 2019-01-31 RX ADMIN — OXYCODONE HYDROCHLORIDE 5 MILLIGRAM(S): 5 TABLET ORAL at 04:44

## 2019-01-31 RX ADMIN — Medication 40 MILLIEQUIVALENT(S): at 15:13

## 2019-01-31 RX ADMIN — PANTOPRAZOLE SODIUM 40 MILLIGRAM(S): 20 TABLET, DELAYED RELEASE ORAL at 12:21

## 2019-01-31 RX ADMIN — HEPARIN SODIUM 5000 UNIT(S): 5000 INJECTION INTRAVENOUS; SUBCUTANEOUS at 21:04

## 2019-01-31 RX ADMIN — MEROPENEM 100 MILLIGRAM(S): 1 INJECTION INTRAVENOUS at 15:11

## 2019-01-31 RX ADMIN — MEROPENEM 100 MILLIGRAM(S): 1 INJECTION INTRAVENOUS at 21:04

## 2019-01-31 RX ADMIN — HEPARIN SODIUM 5000 UNIT(S): 5000 INJECTION INTRAVENOUS; SUBCUTANEOUS at 15:12

## 2019-01-31 RX ADMIN — Medication 650 MILLIGRAM(S): at 04:40

## 2019-01-31 NOTE — PROGRESS NOTE ADULT - SUBJECTIVE AND OBJECTIVE BOX
Interval History:    MEDICATIONS  (STANDING):  carvedilol 12.5 milliGRAM(s) Oral every 12 hours  heparin  Injectable 5000 Unit(s) SubCutaneous every 8 hours  meropenem  IVPB 1000 milliGRAM(s) IV Intermittent every 8 hours  pantoprazole  Injectable 40 milliGRAM(s) IV Push daily  potassium chloride    Tablet ER 40 milliEquivalent(s) Oral every 4 hours    MEDICATIONS  (PRN):  acetaminophen   Tablet .. 650 milliGRAM(s) Oral every 6 hours PRN Temp greater or equal to 38.5C (101.3F), Moderate Pain (4 - 6)  ALBUTerol/ipratropium for Nebulization 3 milliLiter(s) Nebulizer every 8 hours PRN Shortness of Breath and/or Wheezing  ketorolac   Injectable 15 milliGRAM(s) IV Push every 8 hours PRN Severe Pain (7 - 10)  oxyCODONE    IR 5 milliGRAM(s) Oral every 4 hours PRN Moderate Pain (4 - 6)      Daily     Daily   BMI: 29.7 (01-26 @ 00:19)  Change in Weight:  Vital Signs Last 24 Hrs  T(C): 37 (31 Jan 2019 12:04), Max: 38.7 (31 Jan 2019 04:42)  T(F): 98.6 (31 Jan 2019 12:04), Max: 101.6 (31 Jan 2019 04:42)  HR: 85 (31 Jan 2019 12:04) (75 - 90)  BP: 150/96 (31 Jan 2019 12:04) (135/90 - 162/96)  BP(mean): --  RR: 18 (31 Jan 2019 12:04) (16 - 24)  SpO2: 98% (31 Jan 2019 12:04) (95% - 99%)  I&O's Detail    30 Jan 2019 07:01  -  31 Jan 2019 07:00  --------------------------------------------------------  IN:    IV PiggyBack: 100 mL  Total IN: 100 mL    OUT:    Drain: 5 mL    T-Tube: 80 mL  Total OUT: 85 mL    Total NET: 15 mL          PHYSICAL EXAM  General:  Well developed, well nourished, alert and active, no pallor, NAD.  HEENT:    Normal appearance of conjunctiva, ears, nose, lips, oropharynx, and oral mucosa, anicteric.  Neck:  No masses, no asymmetry.  Lymph Nodes:  No lymphadenopathy.   Cardiovascular:  RRR normal S1/S2, no murmur.  Respiratory:  CTA B/L, normal respiratory effort.   Abdominal:   soft, no masses LLQ tenderness, normoactive BS, NT/ND, no HSM.  Extremities:   No clubbing or cyanosis, normal capillary refill, no edema.   Skin:   No rash, jaundice, lesions, eczema.   Musculoskeletal:  No joint swelling, erythema or tenderness.   Other:     Lab Results:                        11.6   6.20  )-----------( 175      ( 31 Jan 2019 06:37 )             35.8     01-31    139  |  103  |  6<L>  ----------------------------<  155<H>  3.3<L>   |  30  |  0.71    Ca    7.9<L>      31 Jan 2019 06:37  Phos  2.3     01-30  Mg     1.8     01-30        PT/INR - ( 30 Jan 2019 06:48 )   PT: 14.9 sec;   INR: 1.33 ratio         PTT - ( 30 Jan 2019 06:48 )  PTT:27.2 sec      Stool Results:          RADIOLOGY RESULTS:    SURGICAL PATHOLOGY:

## 2019-01-31 NOTE — PROGRESS NOTE ADULT - ASSESSMENT
57 y/o male with HTN on coreg admitted on 1/26 for evaluation of one day history lower abdominal pain and constipation; patient was started empirically on zosyn when imaging revealed sigmoid diverticulitis; however he had persistent leukocytosis and antibiotics were changed to ertapenem; patient is tolerating clear diet but still has lower abdominal pain. Never had this happen to him before; has had bowel movement and feels somewhat improved since admission. No recent travel, sick contacts or any other complaints.  1. Patient admitted with acute sigmoid diverticulitis  - iv hydration and supportive care   - serial cbc and monitor temperature   - reviewed prior medical records to evaluate for resistant or atypical pathogens   - diet per surgery  - day #3 meropenem  - tolerating antibiotics without rashes or side effects   - if remains stable, will order midline in am and prepare for discharge on ertapenem one gram daily with weekly cbc, cmp, esr, crp  - will require outpatient imaging per CRS  2. other issues; hypertension   - per medicine  Will follow

## 2019-01-31 NOTE — PROGRESS NOTE ADULT - PROBLEM SELECTOR PLAN 1
Continue IV antibiotics  Pt will need a Sigmoid Colectomy after resolution of inflammation in outpt setting

## 2019-01-31 NOTE — PROGRESS NOTE ADULT - SUBJECTIVE AND OBJECTIVE BOX
no n/v  s/p IR drainage of pelvic collections    Vital Signs Last 24 Hrs  T(C): 36.1 (31 Jan 2019 06:34), Max: 38.7 (31 Jan 2019 04:42)  T(F): 97 (31 Jan 2019 06:34), Max: 101.6 (31 Jan 2019 04:42)  HR: 87 (31 Jan 2019 04:42) (75 - 90)  BP: 142/88 (31 Jan 2019 04:42) (135/90 - 162/96)  BP(mean): --  RR: 18 (31 Jan 2019 04:42) (16 - 24)  SpO2: 95% (31 Jan 2019 04:42) (95% - 99%)    NAD  abd soft  drains in place                          11.6   6.20  )-----------( 175      ( 31 Jan 2019 06:37 )             35.8   01-31    139  |  103  |  6<L>  ----------------------------<  155<H>  3.3<L>   |  30  |  0.71    Ca    7.9<L>      31 Jan 2019 06:37  Phos  2.3     01-30  Mg     1.8     01-30

## 2019-01-31 NOTE — PROGRESS NOTE ADULT - ASSESSMENT
Diverticulitis  complicated by abscess  s/p IR drainage of intra-abd abscess  hypokalemia    cont IV abx  advance diet  oob  flush drain  will need home iv abx  replace K

## 2019-01-31 NOTE — PROGRESS NOTE ADULT - SUBJECTIVE AND OBJECTIVE BOX
Patient is a 58y old  Male who presents with a chief complaint of Abd pain (29 Jan 2019 13:49)    Date of service: 01-31-19 @ 13:00      Patient had IR procedure to place drains; subsequently had fever      ROS: has no chills; denies dizziness, no HA, no SOB or cough,  no diarrhea or constipation; no dysuria, no urinary frequency, no legs pain, no rashes    MEDICATIONS  (STANDING):  carvedilol 12.5 milliGRAM(s) Oral every 12 hours  heparin  Injectable 5000 Unit(s) SubCutaneous every 8 hours  meropenem  IVPB 1000 milliGRAM(s) IV Intermittent every 8 hours  pantoprazole  Injectable 40 milliGRAM(s) IV Push daily  potassium chloride    Tablet ER 40 milliEquivalent(s) Oral every 4 hours    MEDICATIONS  (PRN):  acetaminophen   Tablet .. 650 milliGRAM(s) Oral every 6 hours PRN Temp greater or equal to 38.5C (101.3F), Moderate Pain (4 - 6)  ALBUTerol/ipratropium for Nebulization 3 milliLiter(s) Nebulizer every 8 hours PRN Shortness of Breath and/or Wheezing  ketorolac   Injectable 15 milliGRAM(s) IV Push every 8 hours PRN Severe Pain (7 - 10)  oxyCODONE    IR 5 milliGRAM(s) Oral every 4 hours PRN Moderate Pain (4 - 6)      Vital Signs Last 24 Hrs  T(C): 37 (31 Jan 2019 12:04), Max: 38.7 (31 Jan 2019 04:42)  T(F): 98.6 (31 Jan 2019 12:04), Max: 101.6 (31 Jan 2019 04:42)  HR: 85 (31 Jan 2019 12:04) (75 - 90)  BP: 150/96 (31 Jan 2019 12:04) (135/90 - 162/96)  BP(mean): --  RR: 18 (31 Jan 2019 12:04) (16 - 24)  SpO2: 98% (31 Jan 2019 12:04) (95% - 99%)    Physical Exam:        PE:    Constitutional: frail looking  HEENT: NC/AT, EOMI, PERRLA, conjunctivae clear; ears and nose atraumatic; pharynx clear  Neck: supple; thyroid not palpable  Back: no tenderness  Respiratory: respiratory effort normal; clear to auscultation  Cardiovascular: S1S2 regular, no murmurs  Abdomen: soft, mild lower quadrant tender, not distended, positive BS; no liver or spleen organomegaly; drains in place  Genitourinary: no suprapubic tenderness  Musculoskeletal: no muscle tenderness, no joint swelling or tenderness  Neurological/ Psychiatric: AxOx3, judgement and insight normal;  moving all extremities  Skin: no rashes; no palpable lesions    Labs: all available labs reviewed                         Labs:                          Labs:                        11.6   6.20  )-----------( 175      ( 31 Jan 2019 06:37 )             35.8     01-31    139  |  103  |  6<L>  ----------------------------<  155<H>  3.3<L>   |  30  |  0.71    Ca    7.9<L>      31 Jan 2019 06:37  Phos  2.3     01-30  Mg     1.8     01-30             Cultures:       Culture - Urine (collected 01-26-19 @ 11:25)  Source: .Urine Clean Catch (Midstream)  Final Report (01-27-19 @ 23:11):    No growth                   < from: CT Abdomen and Pelvis w/ Oral Cont and w/ IV Cont (01.26.19 @ 03:07) >    EXAM:  CT ABDOMEN AND PELVIS OC IC                            PROCEDURE DATE:  01/26/2019          INTERPRETATION:  CT ABDOMEN AND PELVIS DATED 1/26/2018.    CLINICAL INFORMATION:  Diffuse abdominal pain.    TECHNIQUE:  Axial CT images through the abdomen and pelvis are acquired   with administration of intravenous contrast. Images are reformatted in   the sagittal and coronal planes. 90cc of Omnipaque 350 were administered   without event.  10cc were discarded.    No prior studies are available for comparison.    FINDINGS:    There is bibasilar dependent and platelike atelectasis.    The liver, gallbladder, biliary tree, pancreas, spleen, adrenal glands,   and kidneys are unremarkable apart from left renal parapelvic cysts. The   urinary bladder is collapsed. The prostate gland is not enlarged.    The stomach is mildly distended. There is no bowel obstruction. There is   descending and sigmoid colonic diverticulosis with short segment focal   thickening of the proximal sigmoid colon about an inflamed diverticulum.   There are surrounding inflammatory changes and small volume of complex   fluid tracking into the pelvis. A normal appendix is identified. There is   no pneumoperitoneum or loculated collection to suggest abscess.    There is no significant abdominal or pelvic lymphadenopathy. There is a   subcentimeter calcified periportal lymph node. There is mild   atherosclerotic calcification of the aortoiliac tree. The abdominal aorta   is normal in caliber.    There is a smallfat-containing right inguinal hernia.    The osseous structures are unremarkable apart from multilevel   degenerative changes of the spine and mild degenerative changes of the   hips.      IMPRESSION:    Acute proximal sigmoid diverticulitis with surrounding inflammatory   change and small volume of complex fluid tracking into the pelvis. No   extraluminal free air or loculated collection to suggest abscess.      < end of copied text >          Radiology: all available radiological tests reviewed    Advanced directives addressed: full resuscitation

## 2019-01-31 NOTE — PROGRESS NOTE ADULT - SUBJECTIVE AND OBJECTIVE BOX
CC: Abd pain	  History of Present Illness: 	  59 y/o male with HTN on coreg presents with 1 day h/o lower abd pain found to have Sig diverticulitis on CT.  Last BM 2 days ago.  Given 3L IVF and zosyn in ED.  Lactate 3-->3.6.  BP OK.   Pt was admitted for severe sepsis secondary to acute diverticulitis.  He is NPO, on IV abx and fluids with pain management.  He is comfortable at bedside but weak with mild abd discomfort on pain meds.  + fevers, no N/V.      1/28: Abd pain slowly improving.  Pt overall feeling better today but still weak, has abd discomfort.   1/29: Pt abd pain improving.  Has some wheezing but otherwise denies complaints.   1/30: Pt with persistent fevers.  Abd pain subsided but still present.  Repeat CT suggestive of abscess formation.  1/31: S/p 2 IR guided drains for abscess drainage.  Pt feeling better, tolerating abx.  Still with persistent fevers.     REVIEW OF SYSTEMS: All other review of systems is negative unless indicated above.    Vital Signs Last 24 Hrs  T(C): 37 (31 Jan 2019 12:04), Max: 38.7 (31 Jan 2019 04:42)  T(F): 98.6 (31 Jan 2019 12:04), Max: 101.6 (31 Jan 2019 04:42)  HR: 85 (31 Jan 2019 12:04) (75 - 90)  BP: 150/96 (31 Jan 2019 12:04) (135/90 - 162/96)  BP(mean): --  RR: 18 (31 Jan 2019 12:04) (16 - 24)  SpO2: 98% (31 Jan 2019 12:04) (95% - 99%)    PHYSICAL EXAM:    Constitutional: NAD, awake and alert  HEENT: PERR, EOMI, Normal Hearing, MMM  Neck: Soft and supple  Respiratory: B/l wheezing  Cardiovascular: S1 and S2, regular rate and rhythm, no Murmurs, gallops or rubs  Gastrointestinal: Bowel Sounds present, + LLQ tenderness --> improving  Extremities: No peripheral edema  Neurological: A/O x 3, no focal deficits in my limited exam  Skin: No rashes    MEDICATIONS  (STANDING):  carvedilol 12.5 milliGRAM(s) Oral every 12 hours  heparin  Injectable 5000 Unit(s) SubCutaneous every 8 hours  meropenem  IVPB 1000 milliGRAM(s) IV Intermittent every 8 hours  pantoprazole  Injectable 40 milliGRAM(s) IV Push daily  potassium chloride    Tablet ER 40 milliEquivalent(s) Oral every 4 hours    MEDICATIONS  (PRN):  acetaminophen   Tablet .. 650 milliGRAM(s) Oral every 6 hours PRN Temp greater or equal to 38.5C (101.3F), Moderate Pain (4 - 6)  ALBUTerol/ipratropium for Nebulization 3 milliLiter(s) Nebulizer every 8 hours PRN Shortness of Breath and/or Wheezing  ketorolac   Injectable 15 milliGRAM(s) IV Push every 8 hours PRN Severe Pain (7 - 10)  oxyCODONE    IR 5 milliGRAM(s) Oral every 4 hours PRN Moderate Pain (4 - 6)                              11.6   6.20  )-----------( 175      ( 31 Jan 2019 06:37 )             35.8     01-31    139  |  103  |  6<L>  ----------------------------<  155<H>  3.3<L>   |  30  |  0.71    Ca    7.9<L>      31 Jan 2019 06:37  Phos  2.3     01-30  Mg     1.8     01-30      CAPILLARY BLOOD GLUCOSE          PT/INR - ( 30 Jan 2019 06:48 )   PT: 14.9 sec;   INR: 1.33 ratio         PTT - ( 30 Jan 2019 06:48 )  PTT:27.2 sec      Assessment and Plan:  59 y/o male with HTN presents with 1 day h/o lower abd pain found to have diverticulitis.    Severe sepsis secondary to acute Diverticulitis w/ abscess formation:  -sepsis present  -CT suggestive of prox sigmoid diverticulitis w/ new abscess formation now s/p IR guided drains x 2  -pip/tazo changed to ertapenem per colorectal, f/u infectious disease recs.  -advance diet per surgery  -Pain control     Asthma:  -[id number]  -incentive spirometry for atelectasis seen on chest xray    Acute renal failure: secondary to sepsis: resolved    HTN  -cont with Coreg     DVT prophy  -sqhep

## 2019-02-01 LAB
-  AMIKACIN: SIGNIFICANT CHANGE UP
-  AMPICILLIN/SULBACTAM: SIGNIFICANT CHANGE UP
-  AMPICILLIN: SIGNIFICANT CHANGE UP
-  AZTREONAM: SIGNIFICANT CHANGE UP
-  CEFEPIME: SIGNIFICANT CHANGE UP
-  CEFOXITIN: SIGNIFICANT CHANGE UP
-  CEFTRIAXONE: SIGNIFICANT CHANGE UP
-  CIPROFLOXACIN: SIGNIFICANT CHANGE UP
-  ERTAPENEM: SIGNIFICANT CHANGE UP
-  GENTAMICIN: SIGNIFICANT CHANGE UP
-  IMIPENEM: SIGNIFICANT CHANGE UP
-  LEVOFLOXACIN: SIGNIFICANT CHANGE UP
-  MEROPENEM: SIGNIFICANT CHANGE UP
-  PIPERACILLIN/TAZOBACTAM: SIGNIFICANT CHANGE UP
-  TETRACYCLINE: SIGNIFICANT CHANGE UP
-  TETRACYCLINE: SIGNIFICANT CHANGE UP
-  TOBRAMYCIN: SIGNIFICANT CHANGE UP
-  TRIMETHOPRIM/SULFAMETHOXAZOLE: SIGNIFICANT CHANGE UP
-  VANCOMYCIN: SIGNIFICANT CHANGE UP
-  VANCOMYCIN: SIGNIFICANT CHANGE UP
ANION GAP SERPL CALC-SCNC: 7 MMOL/L — SIGNIFICANT CHANGE UP (ref 5–17)
BUN SERPL-MCNC: 8 MG/DL — SIGNIFICANT CHANGE UP (ref 7–23)
CALCIUM SERPL-MCNC: 8.7 MG/DL — SIGNIFICANT CHANGE UP (ref 8.5–10.1)
CHLORIDE SERPL-SCNC: 103 MMOL/L — SIGNIFICANT CHANGE UP (ref 96–108)
CO2 SERPL-SCNC: 28 MMOL/L — SIGNIFICANT CHANGE UP (ref 22–31)
CREAT SERPL-MCNC: 0.56 MG/DL — SIGNIFICANT CHANGE UP (ref 0.5–1.3)
GLUCOSE SERPL-MCNC: 111 MG/DL — HIGH (ref 70–99)
HCT VFR BLD CALC: 39.2 % — SIGNIFICANT CHANGE UP (ref 39–50)
HGB BLD-MCNC: 12.9 G/DL — LOW (ref 13–17)
MCHC RBC-ENTMCNC: 30.3 PG — SIGNIFICANT CHANGE UP (ref 27–34)
MCHC RBC-ENTMCNC: 32.9 GM/DL — SIGNIFICANT CHANGE UP (ref 32–36)
MCV RBC AUTO: 92 FL — SIGNIFICANT CHANGE UP (ref 80–100)
METHOD TYPE: SIGNIFICANT CHANGE UP
NRBC # BLD: 0 /100 WBCS — SIGNIFICANT CHANGE UP (ref 0–0)
PLATELET # BLD AUTO: 230 K/UL — SIGNIFICANT CHANGE UP (ref 150–400)
POTASSIUM SERPL-MCNC: 4.2 MMOL/L — SIGNIFICANT CHANGE UP (ref 3.5–5.3)
POTASSIUM SERPL-SCNC: 4.2 MMOL/L — SIGNIFICANT CHANGE UP (ref 3.5–5.3)
RBC # BLD: 4.26 M/UL — SIGNIFICANT CHANGE UP (ref 4.2–5.8)
RBC # FLD: 14.3 % — SIGNIFICANT CHANGE UP (ref 10.3–14.5)
SODIUM SERPL-SCNC: 138 MMOL/L — SIGNIFICANT CHANGE UP (ref 135–145)
WBC # BLD: 6.87 K/UL — SIGNIFICANT CHANGE UP (ref 3.8–10.5)
WBC # FLD AUTO: 6.87 K/UL — SIGNIFICANT CHANGE UP (ref 3.8–10.5)

## 2019-02-01 RX ORDER — PANTOPRAZOLE SODIUM 20 MG/1
40 TABLET, DELAYED RELEASE ORAL
Qty: 0 | Refills: 0 | Status: DISCONTINUED | OUTPATIENT
Start: 2019-02-01 | End: 2019-02-02

## 2019-02-01 RX ADMIN — OXYCODONE HYDROCHLORIDE 5 MILLIGRAM(S): 5 TABLET ORAL at 19:38

## 2019-02-01 RX ADMIN — CARVEDILOL PHOSPHATE 12.5 MILLIGRAM(S): 80 CAPSULE, EXTENDED RELEASE ORAL at 05:01

## 2019-02-01 RX ADMIN — Medication 3 MILLILITER(S): at 05:10

## 2019-02-01 RX ADMIN — HEPARIN SODIUM 5000 UNIT(S): 5000 INJECTION INTRAVENOUS; SUBCUTANEOUS at 05:01

## 2019-02-01 RX ADMIN — PANTOPRAZOLE SODIUM 40 MILLIGRAM(S): 20 TABLET, DELAYED RELEASE ORAL at 11:59

## 2019-02-01 RX ADMIN — MEROPENEM 100 MILLIGRAM(S): 1 INJECTION INTRAVENOUS at 13:34

## 2019-02-01 RX ADMIN — CARVEDILOL PHOSPHATE 12.5 MILLIGRAM(S): 80 CAPSULE, EXTENDED RELEASE ORAL at 17:16

## 2019-02-01 RX ADMIN — HEPARIN SODIUM 5000 UNIT(S): 5000 INJECTION INTRAVENOUS; SUBCUTANEOUS at 21:38

## 2019-02-01 RX ADMIN — Medication 15 MILLIGRAM(S): at 17:29

## 2019-02-01 RX ADMIN — MEROPENEM 100 MILLIGRAM(S): 1 INJECTION INTRAVENOUS at 05:02

## 2019-02-01 RX ADMIN — HEPARIN SODIUM 5000 UNIT(S): 5000 INJECTION INTRAVENOUS; SUBCUTANEOUS at 13:34

## 2019-02-01 RX ADMIN — OXYCODONE HYDROCHLORIDE 5 MILLIGRAM(S): 5 TABLET ORAL at 13:34

## 2019-02-01 RX ADMIN — Medication 15 MILLIGRAM(S): at 19:38

## 2019-02-01 RX ADMIN — MEROPENEM 100 MILLIGRAM(S): 1 INJECTION INTRAVENOUS at 21:37

## 2019-02-01 NOTE — PROGRESS NOTE ADULT - ASSESSMENT
58 year old male with complicated diverticulitis with improvement in WBC and clinical exam although still having fevers. Hold discharge today in light of fevers. Would like to have him afebrile for 24 hours prior to discharge.     Continue IV antibiotics. Plan is for PICC and IV antibiotics when ready for discharge (afebrile for 24 hours)

## 2019-02-01 NOTE — CHART NOTE - NSCHARTNOTEFT_GEN_A_CORE
Assessment:     *pt seen lying in bed during f/u. Pt had first meal and currently on low fiber diet. Provided diet edu regarding low fiber and d/w pt plan to f/u w GI as outpt and plan to incorporate fiber into diet as tolerated. Pt w no c/o after meal.   *pt s/p procedure for abscess drainage d/t  prox sigmoid diverticulitis w/ new abscess formation  *pt reports still w loose stool; continue to monitor.     Recommendations:    1.  Reinforce diet edu PRN  2. Monitor intake and document in EMR  3. Monitor BM   4. Obtain new wt and montiror weekly.         Diet Presciption: Diet, Low Fiber (02-01-19 @ 09:35)      Wt Hx:  Height (cm): 185.42 (01-26-19 @ 00:19)  Weight (kg): 102.1 (01-26-19 @ 00:19)  BMI (kg/m2): 29.7 (01-26-19 @ 00:19)        Estimated Needs:   [x ] no change since previous assessment    Estimated Energy Needs (calories/kg):  · Weight Used for Calculation  adjusted  88.8 kg; IBW for protein    Estimated Energy Needs (25-30 calories/kg):  · Weight  (lbs)  195.7 lb  · Weight (kg)  88.8 kg  · From (25cal/kg)  2220  · To (30cal/kg)  2664    Other Calculation:  · Other Calculation  Ht.  73"     Wt.  102.1Kg        BMI 29.7       IBW   79.9Kg      Pt is at    128%  IBW    Estimated Protein Needs (1.0-1.2 gm/kg):  · Weight  (lbs)  176.1  · Weight (kg)  79.9 kg  · From (1 g/kg)  79.9  · To (1.2 g/kg)  95.88    Estimated Fluid Needs (25-30 ml/kg):  · Weight  (lbs)  195.7  · Weight (kg)  88.8  · From (25 ml/kg)  2220  · To (30 ml/kg)  2664      Nutrition Diagnosis is [ x] ongoing  [ ] resolved [ ] not applicable         New Nutrition Diagnosis: [ x] not applicable     Nutrition Diagnostic #1:  · Nutrition Diagnostic Terminology #1: Knowledge and Beliefs  · Knowledge and Beliefs: Unsupported beliefs/attitudes about food or nutrition-relate; Undesirable food choices  · Etiology: hx of HTN, admitting dx diverticulitis  · Signs/Symptoms: self reported consumption of high fat/Na foods, very limited fiber intake  · Nutrition Intervention: Meals and Snack; Nutrition Education  · Meals and Snacks: General/healthful diet  · Nutrition Education - Content: Recommended modifications; low fiber diet temporarily and f/u w GI to transition gradually to high fiber; general healthy nutrition  · Goal/Expected Outcome: PT w show no s/s of intolerance to PO intake    Pertinent Medications: MEDICATIONS  (STANDING):  carvedilol 12.5 milliGRAM(s) Oral every 12 hours  heparin  Injectable 5000 Unit(s) SubCutaneous every 8 hours  meropenem  IVPB 1000 milliGRAM(s) IV Intermittent every 8 hours  pantoprazole    Tablet 40 milliGRAM(s) Oral before breakfast    MEDICATIONS  (PRN):  acetaminophen   Tablet .. 650 milliGRAM(s) Oral every 6 hours PRN Temp greater or equal to 38.5C (101.3F), Moderate Pain (4 - 6)  ALBUTerol/ipratropium for Nebulization 3 milliLiter(s) Nebulizer every 8 hours PRN Shortness of Breath and/or Wheezing  ketorolac   Injectable 15 milliGRAM(s) IV Push every 8 hours PRN Severe Pain (7 - 10)  oxyCODONE    IR 5 milliGRAM(s) Oral every 4 hours PRN Moderate Pain (4 - 6)    Pertinent Labs: 02-01 Na138 mmol/L Glu 111 mg/dL<H> K+ 4.2 mmol/L Cr  0.56 mg/dL BUN 8 mg/dL 01-30 Phos 2.3 mg/dL<L> 01-26 Alb 3.6 g/dL     CAPILLARY BLOOD GLUCOSE          Skin: chelsea score = 20          Monitoring and Evaluation:   [x] PO intake/Nutr support infusion [ x ] Tolerance to Nutr [ x ] weights [ x ] labs[ x ] follow up per protocol  [ ] other:

## 2019-02-01 NOTE — PROGRESS NOTE ADULT - SUBJECTIVE AND OBJECTIVE BOX
Patient is a 58y old  Male who presents with a chief complaint of Abd pain (29 Jan 2019 13:49)  Date of service: 02-01-19 @ 12:49    Patient lying in bed; no complaints had fever overnite        ROS: no chills; denies dizziness, no HA, no SOB or cough, no abdominal pain, no diarrhea or constipation; no dysuria, no urinary frequency, no legs pain, no rashes    MEDICATIONS  (STANDING):  carvedilol 12.5 milliGRAM(s) Oral every 12 hours  heparin  Injectable 5000 Unit(s) SubCutaneous every 8 hours  meropenem  IVPB 1000 milliGRAM(s) IV Intermittent every 8 hours  pantoprazole    Tablet 40 milliGRAM(s) Oral before breakfast    MEDICATIONS  (PRN):  acetaminophen   Tablet .. 650 milliGRAM(s) Oral every 6 hours PRN Temp greater or equal to 38.5C (101.3F), Moderate Pain (4 - 6)  ALBUTerol/ipratropium for Nebulization 3 milliLiter(s) Nebulizer every 8 hours PRN Shortness of Breath and/or Wheezing  ketorolac   Injectable 15 milliGRAM(s) IV Push every 8 hours PRN Severe Pain (7 - 10)  oxyCODONE    IR 5 milliGRAM(s) Oral every 4 hours PRN Moderate Pain (4 - 6)      Vital Signs Last 24 Hrs  T(C): 37.5 (01 Feb 2019 11:07), Max: 38.3 (31 Jan 2019 20:46)  T(F): 99.5 (01 Feb 2019 11:07), Max: 101 (31 Jan 2019 20:46)  HR: 73 (01 Feb 2019 11:07) (72 - 85)  BP: 131/89 (01 Feb 2019 11:07) (131/89 - 162/90)  BP(mean): --  RR: 18 (01 Feb 2019 11:07) (18 - 18)  SpO2: 95% (01 Feb 2019 11:07) (95% - 98%)    Physical Exam:        PE:    Constitutional: frail looking  HEENT: NC/AT, EOMI, PERRLA, conjunctivae clear; ears and nose atraumatic; pharynx clear  Neck: supple; thyroid not palpable  Back: no tenderness  Respiratory: respiratory effort normal; clear to auscultation  Cardiovascular: S1S2 regular, no murmurs  Abdomen: soft, mild lower quadrant tender, not distended, positive BS; no liver or spleen organomegaly; drains in place  Genitourinary: no suprapubic tenderness  Musculoskeletal: no muscle tenderness, no joint swelling or tenderness  Neurological/ Psychiatric: AxOx3, judgement and insight normal;  moving all extremities  Skin: no rashes; no palpable lesions    Labs: all available labs reviewed                         Labs:                          Labs:                       Labs:                        12.9   6.87  )-----------( 230      ( 01 Feb 2019 08:55 )             39.2     02-01    138  |  103  |  8   ----------------------------<  111<H>  4.2   |  28  |  0.56    Ca    8.7      01 Feb 2019 08:55             Cultures:       Culture - Abscess with Gram Stain (collected 01-30-19 @ 17:40)  Source: .Abscess LEFT LOWER ABD ABSCESS DRAINAGE  Preliminary Report (01-31-19 @ 21:43):    Numerous Gram Negative Rods    Numerous Enterococcus species    Culture - Abscess with Gram Stain (collected 01-30-19 @ 17:38)  Source: .Abscess LEFT UPPER ABD ABSCESS DRAINAGE  Preliminary Report (01-31-19 @ 21:46):    Rare Gram Negative Rods    See previous culture [id number]    Culture - Urine (collected 01-26-19 @ 11:25)  Source: .Urine Clean Catch (Midstream)  Final Report (01-27-19 @ 23:11):    No growth                     < from: CT Abdomen and Pelvis w/ Oral Cont and w/ IV Cont (01.26.19 @ 03:07) >    EXAM:  CT ABDOMEN AND PELVIS OC IC                            PROCEDURE DATE:  01/26/2019          INTERPRETATION:  CT ABDOMEN AND PELVIS DATED 1/26/2018.    CLINICAL INFORMATION:  Diffuse abdominal pain.    TECHNIQUE:  Axial CT images through the abdomen and pelvis are acquired   with administration of intravenous contrast. Images are reformatted in   the sagittal and coronal planes. 90cc of Omnipaque 350 were administered   without event.  10cc were discarded.    No prior studies are available for comparison.    FINDINGS:    There is bibasilar dependent and platelike atelectasis.    The liver, gallbladder, biliary tree, pancreas, spleen, adrenal glands,   and kidneys are unremarkable apart from left renal parapelvic cysts. The   urinary bladder is collapsed. The prostate gland is not enlarged.    The stomach is mildly distended. There is no bowel obstruction. There is   descending and sigmoid colonic diverticulosis with short segment focal   thickening of the proximal sigmoid colon about an inflamed diverticulum.   There are surrounding inflammatory changes and small volume of complex   fluid tracking into the pelvis. A normal appendix is identified. There is   no pneumoperitoneum or loculated collection to suggest abscess.    There is no significant abdominal or pelvic lymphadenopathy. There is a   subcentimeter calcified periportal lymph node. There is mild   atherosclerotic calcification of the aortoiliac tree. The abdominal aorta   is normal in caliber.    There is a smallfat-containing right inguinal hernia.    The osseous structures are unremarkable apart from multilevel   degenerative changes of the spine and mild degenerative changes of the   hips.      IMPRESSION:    Acute proximal sigmoid diverticulitis with surrounding inflammatory   change and small volume of complex fluid tracking into the pelvis. No   extraluminal free air or loculated collection to suggest abscess.      < end of copied text >          Radiology: all available radiological tests reviewed    Advanced directives addressed: full resuscitation

## 2019-02-01 NOTE — PROGRESS NOTE ADULT - SUBJECTIVE AND OBJECTIVE BOX
CC: Abd pain	  History of Present Illness: 	  59 y/o male with HTN on coreg presents with 1 day h/o lower abd pain found to have Sig diverticulitis on CT.  Last BM 2 days ago.  Given 3L IVF and zosyn in ED.  Lactate 3-->3.6.  BP OK.   Pt was admitted for severe sepsis secondary to acute diverticulitis.  He is NPO, on IV abx and fluids with pain management.  He is comfortable at bedside but weak with mild abd discomfort on pain meds.  + fevers, no N/V.      1/28: Abd pain slowly improving.  Pt overall feeling better today but still weak, has abd discomfort.   1/29: Pt abd pain improving.  Has some wheezing but otherwise denies complaints.   1/30: Pt with persistent fevers.  Abd pain subsided but still present.  Repeat CT suggestive of abscess formation.  1/31: S/p 2 IR guided drains for abscess drainage.  Pt feeling better, tolerating abx.  Still with persistent fevers.   2/1: Pt denies complaints but still spiking fevers.  Drains in place with output.  No N, V.     REVIEW OF SYSTEMS: All other review of systems is negative unless indicated above.    Vital Signs Last 24 Hrs  T(C): 37.5 (01 Feb 2019 11:07), Max: 38.3 (31 Jan 2019 20:46)  T(F): 99.5 (01 Feb 2019 11:07), Max: 101 (31 Jan 2019 20:46)  HR: 73 (01 Feb 2019 11:07) (72 - 85)  BP: 131/89 (01 Feb 2019 11:07) (131/89 - 162/90)  BP(mean): --  RR: 18 (01 Feb 2019 11:07) (18 - 18)  SpO2: 95% (01 Feb 2019 11:07) (95% - 98%)    PHYSICAL EXAM:    Constitutional: NAD, awake and alert  HEENT: PERR, EOMI, Normal Hearing, MMM  Neck: Soft and supple  Respiratory: B/l wheezing  Cardiovascular: S1 and S2, regular rate and rhythm, no Murmurs, gallops or rubs  Gastrointestinal: Bowel Sounds present, + LLQ tenderness --> improving  Extremities: No peripheral edema  Neurological: A/O x 3, no focal deficits in my limited exam  Skin: No rashes    MEDICATIONS  (STANDING):  carvedilol 12.5 milliGRAM(s) Oral every 12 hours  heparin  Injectable 5000 Unit(s) SubCutaneous every 8 hours  meropenem  IVPB 1000 milliGRAM(s) IV Intermittent every 8 hours  pantoprazole    Tablet 40 milliGRAM(s) Oral before breakfast    MEDICATIONS  (PRN):  acetaminophen   Tablet .. 650 milliGRAM(s) Oral every 6 hours PRN Temp greater or equal to 38.5C (101.3F), Moderate Pain (4 - 6)  ALBUTerol/ipratropium for Nebulization 3 milliLiter(s) Nebulizer every 8 hours PRN Shortness of Breath and/or Wheezing  ketorolac   Injectable 15 milliGRAM(s) IV Push every 8 hours PRN Severe Pain (7 - 10)  oxyCODONE    IR 5 milliGRAM(s) Oral every 4 hours PRN Moderate Pain (4 - 6)                              12.9   6.87  )-----------( 230      ( 01 Feb 2019 08:55 )             39.2     02-01    138  |  103  |  8   ----------------------------<  111<H>  4.2   |  28  |  0.56    Ca    8.7      01 Feb 2019 08:55      CAPILLARY BLOOD GLUCOSE    Assessment and Plan:  59 y/o male with HTN presents with 1 day h/o lower abd pain found to have diverticulitis.    Severe sepsis secondary to acute Diverticulitis w/ abscess formation:  -sepsis present w/ persistent fevers  -CT suggestive of prox sigmoid diverticulitis w/ new abscess formation now s/p IR guided drains x 2  -pip/tazo changed to ertapenem per colorectal  -ID following --> day #4 meropenem, will need long term abx via midline with ertapenem, f/u final ID recs.   -advance diet per surgery  -Pain control     Asthma:  -duoneb  -incentive spirometry for atelectasis seen on chest xray    Acute renal failure: secondary to sepsis: resolved    HTN  -cont with Coreg     DVT prophy  -sqhep    Dispo:  -pt still spiking fevers, continue IV abx and follow up ID and colorectal recs.

## 2019-02-01 NOTE — PROGRESS NOTE ADULT - SUBJECTIVE AND OBJECTIVE BOX
Feels well. Eager to be discharged but still having fevers overnight- Tmax 101    Exam:  Vital Signs Last 24 Hrs  T(C): 37.8 (01 Feb 2019 04:46), Max: 38.3 (31 Jan 2019 20:46)  T(F): 100 (01 Feb 2019 04:46), Max: 101 (31 Jan 2019 20:46)  HR: 72 (01 Feb 2019 05:11) (72 - 85)  BP: 162/90 (01 Feb 2019 04:46) (143/87 - 162/90)  RR: 18 (01 Feb 2019 04:46) (18 - 18)  SpO2: 98% (01 Feb 2019 05:11) (96% - 98%)    In no distress  Abdomen: soft, minimal pain in LLQ non distended, EDITA drains with serous drainage.    Alert and oriented x 3                          11.6   6.20  )-----------( 175      ( 31 Jan 2019 06:37 )             35.8   01-31    139  |  103  |  6<L>  ----------------------------<  155<H>  3.3<L>   |  30  |  0.71    Ca    7.9<L>      31 Jan 2019 06:37

## 2019-02-01 NOTE — PROGRESS NOTE ADULT - ASSESSMENT
59 y/o male with HTN on coreg admitted on 1/26 for evaluation of one day history lower abdominal pain and constipation; patient was started empirically on zosyn when imaging revealed sigmoid diverticulitis; however he had persistent leukocytosis and antibiotics were changed to ertapenem; patient is tolerating clear diet but still has lower abdominal pain. Never had this happen to him before; has had bowel movement and feels somewhat improved since admission. No recent travel, sick contacts or any other complaints.  1. Patient admitted with acute sigmoid diverticulitis  - iv hydration and supportive care   - serial cbc and monitor temperature   - reviewed prior medical records to evaluate for resistant or atypical pathogens   - diet per surgery  - day #4 meropenem  - tolerating antibiotics without rashes or side effects   - if remains stable, will order midline in am and prepare for discharge on ertapenem one gram daily with weekly cbc, cmp, esr, crp; however ertapenem does not cover enterococcus; will await sensitivities of enterococcus; may need additional oral antibiotic as well  - will require outpatient imaging per CRS  2. other issues; hypertension   - per medicine  Will follow

## 2019-02-02 ENCOUNTER — TRANSCRIPTION ENCOUNTER (OUTPATIENT)
Age: 59
End: 2019-02-02

## 2019-02-02 VITALS
SYSTOLIC BLOOD PRESSURE: 119 MMHG | TEMPERATURE: 98 F | DIASTOLIC BLOOD PRESSURE: 79 MMHG | OXYGEN SATURATION: 97 % | RESPIRATION RATE: 18 BRPM | HEART RATE: 70 BPM

## 2019-02-02 LAB
-  AMIKACIN: SIGNIFICANT CHANGE UP
-  AMPICILLIN/SULBACTAM: SIGNIFICANT CHANGE UP
-  AMPICILLIN: SIGNIFICANT CHANGE UP
-  AZTREONAM: SIGNIFICANT CHANGE UP
-  CEFAZOLIN: SIGNIFICANT CHANGE UP
-  CEFEPIME: SIGNIFICANT CHANGE UP
-  CEFOTAXIME: SIGNIFICANT CHANGE UP
-  CEFOXITIN: SIGNIFICANT CHANGE UP
-  CEFTAZIDIME: SIGNIFICANT CHANGE UP
-  CEFTRIAXONE: SIGNIFICANT CHANGE UP
-  CEFUROXIME: SIGNIFICANT CHANGE UP
-  CIPROFLOXACIN: SIGNIFICANT CHANGE UP
-  ERTAPENEM: SIGNIFICANT CHANGE UP
-  GENTAMICIN: SIGNIFICANT CHANGE UP
-  IMIPENEM: SIGNIFICANT CHANGE UP
-  LEVOFLOXACIN: SIGNIFICANT CHANGE UP
-  MEROPENEM: SIGNIFICANT CHANGE UP
-  PIPERACILLIN/TAZOBACTAM: SIGNIFICANT CHANGE UP
-  TIGECYCLINE: SIGNIFICANT CHANGE UP
-  TOBRAMYCIN: SIGNIFICANT CHANGE UP
-  TRIMETHOPRIM/SULFAMETHOXAZOLE: SIGNIFICANT CHANGE UP
METHOD TYPE: SIGNIFICANT CHANGE UP

## 2019-02-02 RX ORDER — ERTAPENEM SODIUM 1 G/1
1000 INJECTION, POWDER, LYOPHILIZED, FOR SOLUTION INTRAMUSCULAR; INTRAVENOUS EVERY 24 HOURS
Qty: 0 | Refills: 0 | Status: DISCONTINUED | OUTPATIENT
Start: 2019-02-02 | End: 2019-02-02

## 2019-02-02 RX ADMIN — Medication 650 MILLIGRAM(S): at 09:30

## 2019-02-02 RX ADMIN — MEROPENEM 100 MILLIGRAM(S): 1 INJECTION INTRAVENOUS at 05:29

## 2019-02-02 RX ADMIN — PANTOPRAZOLE SODIUM 40 MILLIGRAM(S): 20 TABLET, DELAYED RELEASE ORAL at 05:29

## 2019-02-02 RX ADMIN — OXYCODONE HYDROCHLORIDE 5 MILLIGRAM(S): 5 TABLET ORAL at 08:24

## 2019-02-02 RX ADMIN — OXYCODONE HYDROCHLORIDE 5 MILLIGRAM(S): 5 TABLET ORAL at 09:30

## 2019-02-02 RX ADMIN — OXYCODONE HYDROCHLORIDE 5 MILLIGRAM(S): 5 TABLET ORAL at 00:14

## 2019-02-02 RX ADMIN — Medication 650 MILLIGRAM(S): at 08:26

## 2019-02-02 RX ADMIN — CARVEDILOL PHOSPHATE 12.5 MILLIGRAM(S): 80 CAPSULE, EXTENDED RELEASE ORAL at 05:29

## 2019-02-02 RX ADMIN — ERTAPENEM SODIUM 120 MILLIGRAM(S): 1 INJECTION, POWDER, LYOPHILIZED, FOR SOLUTION INTRAMUSCULAR; INTRAVENOUS at 15:16

## 2019-02-02 RX ADMIN — OXYCODONE HYDROCHLORIDE 5 MILLIGRAM(S): 5 TABLET ORAL at 13:43

## 2019-02-02 RX ADMIN — Medication 1 TABLET(S): at 15:59

## 2019-02-02 RX ADMIN — HEPARIN SODIUM 5000 UNIT(S): 5000 INJECTION INTRAVENOUS; SUBCUTANEOUS at 05:29

## 2019-02-02 RX ADMIN — MEROPENEM 100 MILLIGRAM(S): 1 INJECTION INTRAVENOUS at 13:42

## 2019-02-02 NOTE — ADVANCED PRACTICE NURSE CONSULT - ASSESSMENT
Rec'd. order to place Midline catheter for long-term IV abx. Reviewed chart, labwork, explained all risks and benefits and Pt. A&Ox3 and verbalized understanding. Inserted Arrow EDC Midine 20g 8 cm. length cathter via ultrasound guidance to left cephalic using sterile technique. Brisk blood return, flushes well w/20 ml. NS. Biopatch, statlock and endcap placed. Pt. tolerated well. No CXR needed for midline. Report given to District Nurse. Lot # [id number].

## 2019-02-02 NOTE — PROGRESS NOTE ADULT - SUBJECTIVE AND OBJECTIVE BOX
Doing well. Tolerating diet. No more fevers, Tmax 99.5    Exam:  Vital Signs Last 24 Hrs  T(C): 37.1 (02 Feb 2019 05:05), Max: 37.5 (01 Feb 2019 11:07)  T(F): 98.8 (02 Feb 2019 05:05), Max: 99.5 (01 Feb 2019 11:07)  HR: 79 (02 Feb 2019 05:05) (73 - 85)  BP: 136/85 (02 Feb 2019 05:05) (131/84 - 136/85)  RR: 18 (02 Feb 2019 05:05) (18 - 18)  SpO2: 97% (02 Feb 2019 05:05) (95% - 97%)    In no distress  Abdomen: soft, minimal pain in LLQ non distended, EDITA drains with serous drainage.    Alert and oriented x 3                                     12.9   6.87  )-----------( 230      ( 01 Feb 2019 08:55 )             39.2

## 2019-02-02 NOTE — DISCHARGE NOTE ADULT - PATIENT PORTAL LINK FT
You can access the [email or web address], offered by [facility], by registering with the following website: [email or web address]

## 2019-02-02 NOTE — PROGRESS NOTE ADULT - SUBJECTIVE AND OBJECTIVE BOX
Patient is a 58y old  Male who presents with a chief complaint of Abd pain (29 Jan 2019 13:49)  Date of service: 02-02-19 @ 15:21      Patient lying in bed; tolerating diet  Afebrile      ROS: no fever or chills; denies dizziness, no HA, no SOB or cough, no abdominal pain, no diarrhea or constipation; no dysuria, no urinary frequency, no legs pain, no rashes    MEDICATIONS  (STANDING):  amoxicillin  875 milliGRAM(s)/clavulanate 1 Tablet(s) Oral two times a day  carvedilol 12.5 milliGRAM(s) Oral every 12 hours  ertapenem  IVPB 1000 milliGRAM(s) IV Intermittent every 24 hours  heparin  Injectable 5000 Unit(s) SubCutaneous every 8 hours  pantoprazole    Tablet 40 milliGRAM(s) Oral before breakfast    MEDICATIONS  (PRN):  acetaminophen   Tablet .. 650 milliGRAM(s) Oral every 6 hours PRN Temp greater or equal to 38.5C (101.3F), Moderate Pain (4 - 6)  ALBUTerol/ipratropium for Nebulization 3 milliLiter(s) Nebulizer every 8 hours PRN Shortness of Breath and/or Wheezing  ketorolac   Injectable 15 milliGRAM(s) IV Push every 8 hours PRN Severe Pain (7 - 10)  oxyCODONE    IR 5 milliGRAM(s) Oral every 4 hours PRN Moderate Pain (4 - 6)      Vital Signs Last 24 Hrs  T(C): 36.6 (02 Feb 2019 10:57), Max: 37.2 (01 Feb 2019 16:52)  T(F): 97.8 (02 Feb 2019 10:57), Max: 98.9 (01 Feb 2019 16:52)  HR: 70 (02 Feb 2019 10:57) (70 - 85)  BP: 119/79 (02 Feb 2019 10:57) (119/79 - 136/85)  BP(mean): --  RR: 18 (02 Feb 2019 10:57) (18 - 18)  SpO2: 97% (02 Feb 2019 10:57) (96% - 97%)    Physical Exam:      PE:    Constitutional: frail looking  HEENT: NC/AT, EOMI, PERRLA, conjunctivae clear; ears and nose atraumatic; pharynx clear  Neck: supple; thyroid not palpable  Back: no tenderness  Respiratory: respiratory effort normal; clear to auscultation  Cardiovascular: S1S2 regular, no murmurs  Abdomen: soft, mild lower quadrant tender, not distended, positive BS; no liver or spleen organomegaly; drains in place  Genitourinary: no suprapubic tenderness  Musculoskeletal: no muscle tenderness, no joint swelling or tenderness  Neurological/ Psychiatric: AxOx3, judgement and insight normal;  moving all extremities  Skin: no rashes; no palpable lesions    Labs: all available labs reviewed                         Labs:                          Labs:                       Labs:                        12.9   6.87  )-----------( 230      ( 01 Feb 2019 08:55 )             39.2     02-01    138  |  103  |  8   ----------------------------<  111<H>  4.2   |  28  |  0.56    Ca    8.7      01 Feb 2019 08:55             Cultures:       Culture - Abscess with Gram Stain (collected 01-30-19 @ 17:40)  Source: .Abscess LEFT LOWER ABD ABSCESS DRAINAGE  Preliminary Report (02-01-19 @ 23:45):    Numerous Escherichia coli    Numerous Enterococcus faecium    Few Enterococcus faecalis    Moderate Streptococcus constellatus "Susceptibilities not performed"  Organism: Escherichia coli  Enterococcus faecium  Enterococcus faecalis (02-01-19 @ 23:45)  Organism: Enterococcus faecium (02-01-19 @ 23:45)      -  Ampicillin: S <=2 Predicts results to ampicillin/sulbactam, amoxacillin-clavulanate and  piperacillin-tazobactam.      -  Tetra/Doxy: S <=4      -  Vancomycin: S 0.5      Method Type: AIDEN  Organism: Enterococcus faecalis (02-01-19 @ 23:41)      -  Ampicillin: S <=2 Predicts results to ampicillin/sulbactam, amoxacillin-clavulanate and  piperacillin-tazobactam.      -  Tetra/Doxy: R >8      -  Vancomycin: S 2      Method Type: AIDEN  Organism: Escherichia coli (02-01-19 @ 18:37)      -  Amikacin: S <=16      -  Ampicillin: S <=8 These ampicillin results predict results for amoxicillin      -  Ampicillin/Sulbactam: S <=8/4      -  Aztreonam: S <=4      -  Cefepime: S <=4      -  Cefoxitin: S <=8      -  Ceftriaxone: S <=1 Enterobacter, Citrobacter, and Serratia may develop resistance during prolonged therapy      -  Ciprofloxacin: S <=1      -  Ertapenem: S <=1      -  Gentamicin: S <=4      -  Imipenem: S <=1      -  Levofloxacin: S <=2      -  Meropenem: S <=1      -  Piperacillin/Tazobactam: S <=16      -  Tobramycin: S <=4      -  Trimethoprim/Sulfamethoxazole: S <=2/38      Method Type: AIDEN    Culture - Abscess with Gram Stain (collected 01-30-19 @ 17:38)  Source: .Abscess LEFT UPPER ABD ABSCESS DRAINAGE  Preliminary Report (01-31-19 @ 21:46):    Rare Gram Negative Rods    See previous culture [id number]                     < from: CT Abdomen and Pelvis w/ Oral Cont and w/ IV Cont (01.26.19 @ 03:07) >    EXAM:  CT ABDOMEN AND PELVIS OC IC                            PROCEDURE DATE:  01/26/2019          INTERPRETATION:  CT ABDOMEN AND PELVIS DATED 1/26/2018.    CLINICAL INFORMATION:  Diffuse abdominal pain.    TECHNIQUE:  Axial CT images through the abdomen and pelvis are acquired   with administration of intravenous contrast. Images are reformatted in   the sagittal and coronal planes. 90cc of Omnipaque 350 were administered   without event.  10cc were discarded.    No prior studies are available for comparison.    FINDINGS:    There is bibasilar dependent and platelike atelectasis.    The liver, gallbladder, biliary tree, pancreas, spleen, adrenal glands,   and kidneys are unremarkable apart from left renal parapelvic cysts. The   urinary bladder is collapsed. The prostate gland is not enlarged.    The stomach is mildly distended. There is no bowel obstruction. There is   descending and sigmoid colonic diverticulosis with short segment focal   thickening of the proximal sigmoid colon about an inflamed diverticulum.   There are surrounding inflammatory changes and small volume of complex   fluid tracking into the pelvis. A normal appendix is identified. There is   no pneumoperitoneum or loculated collection to suggest abscess.    There is no significant abdominal or pelvic lymphadenopathy. There is a   subcentimeter calcified periportal lymph node. There is mild   atherosclerotic calcification of the aortoiliac tree. The abdominal aorta   is normal in caliber.    There is a smallfat-containing right inguinal hernia.    The osseous structures are unremarkable apart from multilevel   degenerative changes of the spine and mild degenerative changes of the   hips.      IMPRESSION:    Acute proximal sigmoid diverticulitis with surrounding inflammatory   change and small volume of complex fluid tracking into the pelvis. No   extraluminal free air or loculated collection to suggest abscess.      < end of copied text >          Radiology: all available radiological tests reviewed    Advanced directives addressed: full resuscitation

## 2019-02-02 NOTE — DISCHARGE NOTE ADULT - CARE PROVIDER_API CALL
Justo Ramirez)  ColonRectal Surgery; Surgery  [address] [place] [address]  Phone: [phone]  Fax: [phone]

## 2019-02-02 NOTE — DISCHARGE NOTE ADULT - HOSPITAL COURSE
58 year old male who presented with a one day history of abdominal pain. He was found to have uncomplicated diverticulitis on the initial CT scan and was admitted for antibiotics, IV fluid resuscitation and bowel rest. He met sepsis criteria on presentation and was treated accordingly. On HD 3, despite subjective improvement and improvement in leukocytosis, he continues to have low grade fevers and was quite tender in LLQ on exam. As such, CT scan was repeated which showed complicated diverticulitis with abscess. IR drainage was performed on 1/30 and two drains were placed. He improved and had normalization of WBC and resolution of fevers. He was discharged home with midline, IV antibiotics (ertapenem) and was tolerating diet at the time of discharge.

## 2019-02-02 NOTE — PROGRESS NOTE ADULT - SUBJECTIVE AND OBJECTIVE BOX
CC: Abd pain	  History of Present Illness: 	  57 y/o male with HTN on coreg presents with 1 day h/o lower abd pain found to have Sig diverticulitis on CT.  Last BM 2 days ago.  Given 3L IVF and zosyn in ED.  Lactate 3-->3.6.  BP OK.   Pt was admitted for severe sepsis secondary to acute diverticulitis.  He is NPO, on IV abx and fluids with pain management.  He is comfortable at bedside but weak with mild abd discomfort on pain meds.  + fevers, no N/V.      1/28: Abd pain slowly improving.  Pt overall feeling better today but still weak, has abd discomfort.   1/29: Pt abd pain improving.  Has some wheezing but otherwise denies complaints.   1/30: Pt with persistent fevers.  Abd pain subsided but still present.  Repeat CT suggestive of abscess formation.  1/31: S/p 2 IR guided drains for abscess drainage.  Pt feeling better, tolerating abx.  Still with persistent fevers.   2/1: Pt denies complaints but still spiking fevers.  Drains in place with output.  No N, V.   02/02/19: Patient seen and examined. No new complaints. Anxious to go home.     REVIEW OF SYSTEMS: All other review of systems is negative unless indicated above.    Vital Signs Last 24 Hrs  T(C): 36.6 (02 Feb 2019 10:57), Max: 37.2 (01 Feb 2019 16:52)  T(F): 97.8 (02 Feb 2019 10:57), Max: 98.9 (01 Feb 2019 16:52)  HR: 70 (02 Feb 2019 10:57) (70 - 85)  BP: 119/79 (02 Feb 2019 10:57) (119/79 - 136/85)  BP(mean): --  RR: 18 (02 Feb 2019 10:57) (18 - 18)  SpO2: 97% (02 Feb 2019 10:57) (96% - 97%)      PHYSICAL EXAM:    Constitutional: NAD, awake and alert  HEENT: PERR, EOMI, Normal Hearing, MMM  Neck: Soft and supple  Respiratory: B/l wheezing  Cardiovascular: S1 and S2, regular rate and rhythm, no Murmurs, gallops or rubs  Gastrointestinal: Bowel Sounds present, + LLQ tenderness --> improving  Extremities: No peripheral edema  Neurological: A/O x 3, no focal deficits in my limited exam  Skin: No rashes        MEDICATIONS:    carvedilol 12.5 milliGRAM(s) Oral every 12 hours  heparin  Injectable 5000 Unit(s) SubCutaneous every 8 hours  meropenem  IVPB 1000 milliGRAM(s) IV Intermittent every 8 hours  pantoprazole    Tablet 40 milliGRAM(s) Oral before breakfast    MEDICATIONS  (PRN):  acetaminophen   Tablet .. 650 milliGRAM(s) Oral every 6 hours PRN Temp greater or equal to 38.5C (101.3F), Moderate Pain (4 - 6)  ALBUTerol/ipratropium for Nebulization 3 milliLiter(s) Nebulizer every 8 hours PRN Shortness of Breath and/or Wheezing  ketorolac   Injectable 15 milliGRAM(s) IV Push every 8 hours PRN Severe Pain (7 - 10)  oxyCODONE    IR 5 milliGRAM(s) Oral every 4 hours PRN Moderate Pain (4 - 6)                   Assessment and Plan:  57 y/o male with HTN presents with 1 day h/o lower abd pain found to have diverticulitis.    Severe sepsis secondary to acute Diverticulitis w/ abscess formation:  -sepsis present w/ persistent fevers  -CT suggestive of prox sigmoid diverticulitis w/ new abscess formation now s/p IR guided drains x 2  -pip/tazo changed to ertapenem per colorectal  -ID following --> day #5 meropenem, S/P midline  -advance diet per surgery    Asthma:  -duoneb  -incentive spirometry for atelectasis seen on chest xray    Acute renal failure: secondary to sepsis: resolved    HTN  -cont with Coreg       Dispo:  -Possibly today

## 2019-02-02 NOTE — PROGRESS NOTE ADULT - ASSESSMENT
58 year old male with complicated diverticulitis with improvement in WBC and clinical exam and improved fevers.     Discharge home today after midline placement with IV antibiotics. Follow up in office in 2 weeks.

## 2019-02-02 NOTE — PROGRESS NOTE ADULT - ASSESSMENT
57 y/o male with HTN on coreg admitted on 1/26 for evaluation of one day history lower abdominal pain and constipation; patient was started empirically on zosyn when imaging revealed sigmoid diverticulitis; however he had persistent leukocytosis and antibiotics were changed to ertapenem; patient is tolerating clear diet but still has lower abdominal pain. Never had this happen to him before; has had bowel movement and feels somewhat improved since admission. No recent travel, sick contacts or any other complaints.  1. Patient admitted with acute sigmoid diverticulitis  - iv hydration and supportive care   - serial cbc and monitor temperature   - reviewed prior medical records to evaluate for resistant or atypical pathogens   - diet per surgery  - day #5 meropenem  - tolerating antibiotics without rashes or side effects   - will give ertapenem one gram today, okay from id standpoint to discharge home on ertapenem one gram daily until 3/1 with weekly cbc, cmp, esr, crp; will also need 2 weeks augmentin 875 mg po q 12 hours given Enterococcus in culture as ertapenem does not treat Enterococcus  - will require outpatient imaging per CRS  2. other issues; hypertension   - per medicine  Will follow

## 2019-02-02 NOTE — PROGRESS NOTE ADULT - REASON FOR ADMISSION
Abd pain

## 2019-02-02 NOTE — DISCHARGE NOTE ADULT - CARE PLAN
Principal Discharge DX:	Diverticulitis of colon  Goal:	recover from infection  Assessment and plan of treatment:	s/p drainage of abscess by IR. Continue IV antibiotics  Secondary Diagnosis:	Sepsis  Secondary Diagnosis:	MARY (acute kidney injury)

## 2019-02-03 LAB
-  AMIKACIN: SIGNIFICANT CHANGE UP
-  AMPICILLIN/SULBACTAM: SIGNIFICANT CHANGE UP
-  AMPICILLIN: SIGNIFICANT CHANGE UP
-  AZTREONAM: SIGNIFICANT CHANGE UP
-  CEFEPIME: SIGNIFICANT CHANGE UP
-  CEFOXITIN: SIGNIFICANT CHANGE UP
-  CEFTRIAXONE: SIGNIFICANT CHANGE UP
-  CIPROFLOXACIN: SIGNIFICANT CHANGE UP
-  ERTAPENEM: SIGNIFICANT CHANGE UP
-  GENTAMICIN: SIGNIFICANT CHANGE UP
-  IMIPENEM: SIGNIFICANT CHANGE UP
-  LEVOFLOXACIN: SIGNIFICANT CHANGE UP
-  MEROPENEM: SIGNIFICANT CHANGE UP
-  PIPERACILLIN/TAZOBACTAM: SIGNIFICANT CHANGE UP
-  TOBRAMYCIN: SIGNIFICANT CHANGE UP
-  TRIMETHOPRIM/SULFAMETHOXAZOLE: SIGNIFICANT CHANGE UP
CULTURE RESULTS: SIGNIFICANT CHANGE UP
CULTURE RESULTS: SIGNIFICANT CHANGE UP
METHOD TYPE: SIGNIFICANT CHANGE UP
ORGANISM # SPEC MICROSCOPIC CNT: SIGNIFICANT CHANGE UP
SPECIMEN SOURCE: SIGNIFICANT CHANGE UP
SPECIMEN SOURCE: SIGNIFICANT CHANGE UP

## 2019-02-06 DIAGNOSIS — J45.909 UNSPECIFIED ASTHMA, UNCOMPLICATED: ICD-10-CM

## 2019-02-06 DIAGNOSIS — K57.20 DIVERTICULITIS OF LARGE INTESTINE WITH PERFORATION AND ABSCESS WITHOUT BLEEDING: ICD-10-CM

## 2019-02-06 DIAGNOSIS — A41.9 SEPSIS, UNSPECIFIED ORGANISM: ICD-10-CM

## 2019-02-06 DIAGNOSIS — E87.6 HYPOKALEMIA: ICD-10-CM

## 2019-02-06 DIAGNOSIS — I10 ESSENTIAL (PRIMARY) HYPERTENSION: ICD-10-CM

## 2019-02-06 DIAGNOSIS — N17.9 ACUTE KIDNEY FAILURE, UNSPECIFIED: ICD-10-CM

## 2019-02-06 DIAGNOSIS — Z79.899 OTHER LONG TERM (CURRENT) DRUG THERAPY: ICD-10-CM

## 2019-02-06 DIAGNOSIS — R65.20 SEVERE SEPSIS WITHOUT SEPTIC SHOCK: ICD-10-CM

## 2019-02-20 ENCOUNTER — OTHER (OUTPATIENT)
Age: 59
End: 2019-02-20

## 2019-02-20 ENCOUNTER — MESSAGE (OUTPATIENT)
Age: 59
End: 2019-02-20

## 2019-02-24 ENCOUNTER — EMERGENCY (EMERGENCY)
Facility: HOSPITAL | Age: 59
LOS: 0 days | Discharge: HOME | End: 2019-02-24
Attending: EMERGENCY MEDICINE | Admitting: EMERGENCY MEDICINE
Payer: COMMERCIAL

## 2019-02-24 VITALS
TEMPERATURE: 98 F | HEART RATE: 88 BPM | HEIGHT: 73 IN | RESPIRATION RATE: 18 BRPM | OXYGEN SATURATION: 95 % | WEIGHT: 220.02 LBS | SYSTOLIC BLOOD PRESSURE: 138 MMHG | DIASTOLIC BLOOD PRESSURE: 102 MMHG
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
## 2019-02-24 DIAGNOSIS — T85.698A OTHER MECHANICAL COMPLICATION OF OTHER SPECIFIED INTERNAL PROSTHETIC DEVICES, IMPLANTS AND GRAFTS, INITIAL ENCOUNTER: ICD-10-CM

## 2019-02-24 DIAGNOSIS — T85.898A OTHER SPECIFIED COMPLICATION OF OTHER INTERNAL PROSTHETIC DEVICES, IMPLANTS AND GRAFTS, INITIAL ENCOUNTER: ICD-10-CM

## 2019-02-24 DIAGNOSIS — Z87.19 PERSONAL HISTORY OF OTHER DISEASES OF THE DIGESTIVE SYSTEM: ICD-10-CM

## 2019-02-24 PROCEDURE — 99284 EMERGENCY DEPT VISIT MOD MDM: CPT

## 2019-02-24 NOTE — ED STATDOCS - PROGRESS NOTE DETAILS
Paged Dr. Escoto, no answer.  Paged on call PEÑA Gonzalez PA-C I spoke with Dr. Borden on call for IR.  He thinks patient may be discharged and call Dr. Escoto tomorrow in IR for outpatient appointment.  Nisha Gonzalez PA-C Enmanuel Lanza.  Nisha Gonzalez PA-C Pt landon.  Will DC with call for outpatient replacement tomorrow.  Nisha Gonzalez PA-C

## 2019-02-24 NOTE — ED STATDOCS - CARE PLAN
Principal Discharge DX:	Drainage from wound Principal Discharge DX:	EDITA drain, broken, initial encounter

## 2019-02-24 NOTE — ED ADULT TRIAGE NOTE - CHIEF COMPLAINT QUOTE
pt presents to ED sent in by MD Butler for eval of abdominal drain. pt w/ hx of diverticulosis had abd drain placed which is no longer flushing.

## 2019-02-24 NOTE — ED STATDOCS - OBJECTIVE STATEMENT
57 y/o male with a PMHx of  presents to the ED sent in by Dr. Butler for evaluation of abd drain. Pt with hx diverticulitis, found to have large left upper abd abscess and small left lower abd abscess, 14 Greenlandic drain and 12 Greenlandic drain placed on 1/30/19, one drain is no longer flushing. On  IV Invanz, was also on Augmentin but finished. Denies abd pain, fever. PMD- Dr. Rodriguez. GI- Dr. Butler. Surgeon- Dr. Escoto.

## 2019-02-24 NOTE — ED STATDOCS - CLINICAL SUMMARY MEDICAL DECISION MAKING FREE TEXT BOX
Pt with no drainage from one of two EDITA drains, the other is working.  No abdominal pain, no surrounding erythema/edema, no discharge, no fever.  Pt without complaints.  Discussed with IR, states if patient otherwise asymptomatic, afebrile, can go home, and schedule for new drain/evaluation of drain.  Pt amenable to this plan.  Strict return precautions.

## 2019-02-24 NOTE — ED STATDOCS - GASTROINTESTINAL, MLM
abdomen soft, non-tender, and non-distended. Bowel sounds present. +2 abd drains in good position. No surrounding redness no erythema no abd TTP

## 2019-02-24 NOTE — ED ADULT NURSE NOTE - OBJECTIVE STATEMENT
ED sent in by Dr. Butler for evaluation of abd drain. Pt with hx diverticulitis, found to have large left upper abd abscess and small left lower abd abscess, 14 Amharic drain and 12 Amharic drain placed on 1/30/19, one drain is no longer flushing.

## 2019-02-26 ENCOUNTER — OUTPATIENT (OUTPATIENT)
Dept: OUTPATIENT SERVICES | Facility: HOSPITAL | Age: 59
LOS: 1 days | Discharge: HOME | End: 2019-02-26

## 2019-03-04 DIAGNOSIS — K57.20 DIVERTICULITIS OF LARGE INTESTINE WITH PERFORATION AND ABSCESS WITHOUT BLEEDING: ICD-10-CM

## 2019-03-06 ENCOUNTER — APPOINTMENT (OUTPATIENT)
Dept: COLORECTAL SURGERY | Facility: CLINIC | Age: 59
End: 2019-03-06
Payer: COMMERCIAL

## 2019-03-06 VITALS
RESPIRATION RATE: 14 BRPM | SYSTOLIC BLOOD PRESSURE: 132 MMHG | TEMPERATURE: 98.1 F | BODY MASS INDEX: 3.31 KG/M2 | HEIGHT: 73 IN | WEIGHT: 25 LBS | DIASTOLIC BLOOD PRESSURE: 82 MMHG | HEART RATE: 69 BPM

## 2019-03-06 DIAGNOSIS — Z87.898 PERSONAL HISTORY OF OTHER SPECIFIED CONDITIONS: ICD-10-CM

## 2019-03-06 DIAGNOSIS — K57.80 DIVERTICULITIS OF INTESTINE, PART UNSPECIFIED, WITH PERFORATION AND ABSCESS W/OUT BLEEDING: ICD-10-CM

## 2019-03-06 PROCEDURE — 99243 OFF/OP CNSLTJ NEW/EST LOW 30: CPT

## 2019-03-06 RX ORDER — METRONIDAZOLE 500 MG/1
500 TABLET ORAL
Qty: 3 | Refills: 0 | Status: ACTIVE | COMMUNITY
Start: 2019-03-06 | End: 1900-01-01

## 2019-03-06 RX ORDER — NEOMYCIN SULFATE 500 MG/1
500 TABLET ORAL
Qty: 6 | Refills: 0 | Status: ACTIVE | COMMUNITY
Start: 2019-03-06 | End: 1900-01-01

## 2019-03-06 RX ORDER — POLYETHYLENE GLYCOL 3350, SODIUM CHLORIDE, SODIUM BICARBONATE, POTASSIUM CHLORIDE AND BISACODYL DELAYED-RELEASE TABLET 5 MG-210 G
5-210 KIT ORAL
Qty: 1 | Refills: 0 | Status: ACTIVE | COMMUNITY
Start: 2019-03-06 | End: 1900-01-01

## 2019-03-06 NOTE — HISTORY OF PRESENT ILLNESS
[FreeTextEntry1] : Mr. Ruiz presents to the office for followup after initial hospitalization for complicated diverticulitis with abscess requiring percutaneous drainage catheters. He was admitted from 1/26/2019 - 2/2/2019. On 1/30/2019, he underwent IR placement of drains into intra-abdominal abscesses. He was discharged home with IV Invanz and the drains.  Since that time, a repeat CT with fluoro was performed on 2/26/19 which purportedly demonstrates resolution of the abscesses. His drains are still in place per request of Dr. Lanza. \par Patient denies any abdominal pain and difficulties with po intake. \par He states that he is to get a colonoscopy the end of March and this will be his first colonoscopy.

## 2019-03-06 NOTE — ASSESSMENT
[FreeTextEntry1] : Mr. Ruiz presents to the office for discussion of laparoscopic sigmoid colectomy. I have advised him to undergo elective resection as his disease was fairly advanced and should he have a recurrent attack, we may not have the luxury of having him scheduled for an elective one stage procedure than can be performed laparoscopically. \par We discussed the laparoscopic plan of approach with possible conversion to open should the operation not proceed in as timely a manner as expected.  The anatomy was defined and the risk of anastomotic leak was detailed. Leak rate is  ~5% despite our efforts to ensure a healthy, properly oriented, well perfused anastomosis that is not under tension is created.  Should such  a complication arise, depending on the magnitude, percutaneous drainage, diverging ileostomy or formation of an end colostomy are all options that will be pursued to treat the pelvic sepsis. Postoperative complications such just wound infection, dehiscence, prolonged postoperative ileus were all addressed. Duration of surgery, length of hospitalization, criterion for discharge, length of convalescence were detailed.  \par Duration of surgery, length of hospitalization, criterion for discharge, length of convalescence were detailed. We also discussed the need to complete an oral abx and mechanical bowel prep prior to surgery, in addition to obtaining medical clearance and presurgical testing. After all questions were addressed, he was in agreement to proceed with scheduling. He is aware that his colonoscopy is to be completed before surgery. \par

## 2019-04-22 ENCOUNTER — OTHER (OUTPATIENT)
Age: 59
End: 2019-04-22

## 2019-04-22 ENCOUNTER — OUTPATIENT (OUTPATIENT)
Dept: OUTPATIENT SERVICES | Facility: HOSPITAL | Age: 59
LOS: 1 days | Discharge: HOME | End: 2019-04-22
Payer: COMMERCIAL

## 2019-04-22 VITALS
HEIGHT: 73 IN | OXYGEN SATURATION: 98 % | HEART RATE: 73 BPM | SYSTOLIC BLOOD PRESSURE: 151 MMHG | WEIGHT: 235.01 LBS | RESPIRATION RATE: 20 BRPM | TEMPERATURE: 98 F | DIASTOLIC BLOOD PRESSURE: 94 MMHG

## 2019-04-22 DIAGNOSIS — Z98.890 OTHER SPECIFIED POSTPROCEDURAL STATES: Chronic | ICD-10-CM

## 2019-04-22 DIAGNOSIS — K57.80 DIVERTICULITIS OF INTESTINE, PART UNSPECIFIED, WITH PERFORATION AND ABSCESS WITHOUT BLEEDING: ICD-10-CM

## 2019-04-22 DIAGNOSIS — Z29.9 ENCOUNTER FOR PROPHYLACTIC MEASURES, UNSPECIFIED: ICD-10-CM

## 2019-04-22 LAB
ALBUMIN SERPL ELPH-MCNC: 3.9 G/DL — SIGNIFICANT CHANGE UP (ref 3.3–5)
ALP SERPL-CCNC: 76 U/L — SIGNIFICANT CHANGE UP (ref 40–120)
ALT FLD-CCNC: 29 U/L — SIGNIFICANT CHANGE UP (ref 12–78)
ANION GAP SERPL CALC-SCNC: 7 MMOL/L — SIGNIFICANT CHANGE UP (ref 5–17)
APTT BLD: 34.8 SEC — SIGNIFICANT CHANGE UP (ref 27.5–36.3)
AST SERPL-CCNC: 23 U/L — SIGNIFICANT CHANGE UP (ref 15–37)
BASOPHILS # BLD AUTO: 0.02 K/UL — SIGNIFICANT CHANGE UP (ref 0–0.2)
BASOPHILS NFR BLD AUTO: 0.4 % — SIGNIFICANT CHANGE UP (ref 0–2)
BILIRUB DIRECT SERPL-MCNC: <0.1 MG/DL — SIGNIFICANT CHANGE UP (ref 0–0.2)
BILIRUB SERPL-MCNC: 0.2 MG/DL — SIGNIFICANT CHANGE UP (ref 0.2–1.2)
BLD GP AB SCN SERPL QL: SIGNIFICANT CHANGE UP
BUN SERPL-MCNC: 13 MG/DL — SIGNIFICANT CHANGE UP (ref 7–23)
CALCIUM SERPL-MCNC: 9.1 MG/DL — SIGNIFICANT CHANGE UP (ref 8.5–10.1)
CHLORIDE SERPL-SCNC: 106 MMOL/L — SIGNIFICANT CHANGE UP (ref 96–108)
CO2 SERPL-SCNC: 27 MMOL/L — SIGNIFICANT CHANGE UP (ref 22–31)
CREAT SERPL-MCNC: 0.74 MG/DL — SIGNIFICANT CHANGE UP (ref 0.5–1.3)
EOSINOPHIL # BLD AUTO: 0.25 K/UL — SIGNIFICANT CHANGE UP (ref 0–0.5)
EOSINOPHIL NFR BLD AUTO: 5.4 % — SIGNIFICANT CHANGE UP (ref 0–6)
GLUCOSE SERPL-MCNC: 88 MG/DL — SIGNIFICANT CHANGE UP (ref 70–99)
HCT VFR BLD CALC: 41.5 % — SIGNIFICANT CHANGE UP (ref 39–50)
HGB BLD-MCNC: 13.9 G/DL — SIGNIFICANT CHANGE UP (ref 13–17)
IMM GRANULOCYTES NFR BLD AUTO: 0.2 % — SIGNIFICANT CHANGE UP (ref 0–1.5)
INR BLD: 1.03 RATIO — SIGNIFICANT CHANGE UP (ref 0.88–1.16)
LYMPHOCYTES # BLD AUTO: 1.23 K/UL — SIGNIFICANT CHANGE UP (ref 1–3.3)
LYMPHOCYTES # BLD AUTO: 26.7 % — SIGNIFICANT CHANGE UP (ref 13–44)
MCHC RBC-ENTMCNC: 30.1 PG — SIGNIFICANT CHANGE UP (ref 27–34)
MCHC RBC-ENTMCNC: 33.5 GM/DL — SIGNIFICANT CHANGE UP (ref 32–36)
MCV RBC AUTO: 89.8 FL — SIGNIFICANT CHANGE UP (ref 80–100)
MONOCYTES # BLD AUTO: 0.73 K/UL — SIGNIFICANT CHANGE UP (ref 0–0.9)
MONOCYTES NFR BLD AUTO: 15.8 % — HIGH (ref 2–14)
NEUTROPHILS # BLD AUTO: 2.37 K/UL — SIGNIFICANT CHANGE UP (ref 1.8–7.4)
NEUTROPHILS NFR BLD AUTO: 51.5 % — SIGNIFICANT CHANGE UP (ref 43–77)
NRBC # BLD: 0 /100 WBCS — SIGNIFICANT CHANGE UP (ref 0–0)
PLATELET # BLD AUTO: 214 K/UL — SIGNIFICANT CHANGE UP (ref 150–400)
POTASSIUM SERPL-MCNC: 3.9 MMOL/L — SIGNIFICANT CHANGE UP (ref 3.5–5.3)
POTASSIUM SERPL-SCNC: 3.9 MMOL/L — SIGNIFICANT CHANGE UP (ref 3.5–5.3)
PROT SERPL-MCNC: 7.2 GM/DL — SIGNIFICANT CHANGE UP (ref 6–8.3)
PROTHROM AB SERPL-ACNC: 11.5 SEC — SIGNIFICANT CHANGE UP (ref 10–12.9)
RBC # BLD: 4.62 M/UL — SIGNIFICANT CHANGE UP (ref 4.2–5.8)
RBC # FLD: 14.1 % — SIGNIFICANT CHANGE UP (ref 10.3–14.5)
SODIUM SERPL-SCNC: 140 MMOL/L — SIGNIFICANT CHANGE UP (ref 135–145)
TYPE + AB SCN PNL BLD: SIGNIFICANT CHANGE UP
WBC # BLD: 4.61 K/UL — SIGNIFICANT CHANGE UP (ref 3.8–10.5)
WBC # FLD AUTO: 4.61 K/UL — SIGNIFICANT CHANGE UP (ref 3.8–10.5)

## 2019-04-22 PROCEDURE — 93010 ELECTROCARDIOGRAM REPORT: CPT

## 2019-04-22 PROCEDURE — 71046 X-RAY EXAM CHEST 2 VIEWS: CPT | Mod: 26

## 2019-04-22 RX ORDER — ERTAPENEM SODIUM 1 G/1
1 INJECTION, POWDER, LYOPHILIZED, FOR SOLUTION INTRAMUSCULAR; INTRAVENOUS
Qty: 0 | Refills: 0 | COMMUNITY

## 2019-04-22 RX ORDER — ALBUTEROL 90 UG/1
2 AEROSOL, METERED ORAL
Qty: 0 | Refills: 0 | COMMUNITY

## 2019-04-22 RX ORDER — COLESEVELAM HYDROCHLORIDE 625 MG/1
3 TABLET, FILM COATED ORAL
Qty: 0 | Refills: 0 | COMMUNITY

## 2019-04-22 RX ORDER — FLUTICASONE FUROATE AND VILANTEROL TRIFENATATE 100; 25 UG/1; UG/1
1 POWDER RESPIRATORY (INHALATION)
Qty: 0 | Refills: 0 | COMMUNITY

## 2019-04-22 NOTE — H&P PST ADULT - ASSESSMENT
58 y/o male with history of diverticulitis with abscess. Scheduled for Laparoscopic possible open sigmoid colectomy, possible ostomy.   Plan  1. Stop all NSAIDS, herbal supplements and vitamins for 7 days.  2. NPO at midnight.  3. Take the following medications ( Coreg ) with small sips of water on the morning of your procedure/surgery.  4. Use EZ sponges as directed  5. Labs, EKG, CXR as per surgeon  6. PMD visit for optimization prior to surgery as per surgeon    CAPRINI SCORE [CLOT]  AGE RELATED RISK FACTORS                                                       MOBILITY RELATED FACTORS  [ x] Age 41-60 years                                            (1 Point)                  [ ] Bed rest                                                        (1 Point)  [ ] Age: 61-74 years                                           (2 Points)                 [ ] Plaster cast                                                   (2 Points)  [ ] Age= 75 years                                              (3 Points)                 [ ] Bed bound for more than 72 hours                 (2 Points)    DISEASE RELATED RISK FACTORS                                               GENDER SPECIFIC FACTORS  [ ] Edema in the lower extremities                       (1 Point)                  [ ] Pregnancy                                                     (1 Point)  [ ] Varicose veins                                               (1 Point)                  [ ] Post-partum < 6 weeks                                   (1 Point)             [x ] BMI > 25 Kg/m2                                            (1 Point)                  [ ] Hormonal therapy  or oral contraception          (1 Point)                 [ ] Sepsis (in the previous month)                        (1 Point)                  [ ] History of pregnancy complications                 (1 point)  [ ] Pneumonia or serious lung disease                                               [ ] Unexplained or recurrent                     (1 Point)           (in the previous month)                               (1 Point)  [ ] Abnormal pulmonary function test                     (1 Point)                 SURGERY RELATED RISK FACTORS  [ ] Acute myocardial infarction                              (1 Point)                 [ ]  Section                                             (1 Point)  [ ] Congestive heart failure (in the previous month)  (1 Point)               [ ] Minor surgery                                                  (1 Point)   [ x] Inflammatory bowel disease                             (1 Point)                 [ ] Arthroscopic surgery                                        (2 Points)  [ ] Central venous access                                      (2 Points)                [x ] General surgery lasting more than 45 minutes   (2 Points)       [ ] Stroke (in the previous month)                          (5 Points)               [ ] Elective arthroplasty                                         (5 Points)     ()  malignancy                                                             (2 points )                                                                                                                                      HEMATOLOGY RELATED FACTORS                                                 TRAUMA RELATED RISK FACTORS  [ ] Prior episodes of VTE                                     (3 Points)                 [ ] Fracture of the hip, pelvis, or leg                       (5 Points)  [ ] Positive family history for VTE                         (3 Points)                 [ ] Acute spinal cord injury (in the previous month)  (5 Points)  [ ] Prothrombin 50965 A                                     (3 Points)                 [ ] Paralysis  (less than 1 month)                             (5 Points)  [ ] Factor V Leiden                                             (3 Points)                  [ ] Multiple Trauma within 1 month                        (5 Points)  [ ] Lupus anticoagulants                                     (3 Points)                                                           [ ] Anticardiolipin antibodies                               (3 Points)                                                       [ ] High homocysteine in the blood                      (3 Points)                                             [ ] Other congenital or acquired thrombophilia      (3 Points)                                                [ ] Heparin induced thrombocytopenia                  (3 Points)    (  ) Malignancy                                        Total Score [      5    ]

## 2019-04-22 NOTE — H&P PST ADULT - HISTORY OF PRESENT ILLNESS
60 y/o male with history of diverticulitis with abscess. Pt reports he was hospitalized in January 2019 for diverticulitis with abscess. He was treated with IV antibiotics and had percutaneous drainage catheters which were removed about 2 weeks ago. Denies abdominal pain at present. Scheduled for Laparoscopic possible open sigmoid colectomy, possible ostomy.

## 2019-04-22 NOTE — H&P PST ADULT - NSICDXPASTSURGICALHX_GEN_ALL_CORE_FT
PAST SURGICAL HISTORY:  H/O colonoscopy 04/2019 PAST SURGICAL HISTORY:  H/O colonoscopy 04/2019    History of other surgery percutaneous drainage catheters 02/2019

## 2019-04-23 PROBLEM — K57.90 DIVERTICULOSIS OF INTESTINE, PART UNSPECIFIED, WITHOUT PERFORATION OR ABSCESS WITHOUT BLEEDING: Chronic | Status: INACTIVE | Noted: 2019-02-24 | Resolved: 2019-04-22

## 2019-04-23 LAB — HBA1C BLD-MCNC: 5.7 % — HIGH (ref 4–5.6)

## 2019-04-23 RX ORDER — SODIUM CHLORIDE 9 MG/ML
3 INJECTION INTRAMUSCULAR; INTRAVENOUS; SUBCUTANEOUS EVERY 8 HOURS
Qty: 0 | Refills: 0 | Status: DISCONTINUED | OUTPATIENT
Start: 2019-04-24 | End: 2019-04-27

## 2019-04-23 RX ORDER — NALOXONE HYDROCHLORIDE 4 MG/.1ML
0.1 SPRAY NASAL
Qty: 0 | Refills: 0 | Status: DISCONTINUED | OUTPATIENT
Start: 2019-04-24 | End: 2019-04-27

## 2019-04-23 RX ORDER — ONDANSETRON 8 MG/1
4 TABLET, FILM COATED ORAL EVERY 6 HOURS
Qty: 0 | Refills: 0 | Status: DISCONTINUED | OUTPATIENT
Start: 2019-04-24 | End: 2019-04-27

## 2019-04-23 RX ORDER — HYDROMORPHONE HYDROCHLORIDE 2 MG/ML
0.5 INJECTION INTRAMUSCULAR; INTRAVENOUS; SUBCUTANEOUS
Qty: 0 | Refills: 0 | Status: DISCONTINUED | OUTPATIENT
Start: 2019-04-24 | End: 2019-04-26

## 2019-04-23 RX ORDER — HYDROMORPHONE HYDROCHLORIDE 2 MG/ML
30 INJECTION INTRAMUSCULAR; INTRAVENOUS; SUBCUTANEOUS
Qty: 0 | Refills: 0 | Status: DISCONTINUED | OUTPATIENT
Start: 2019-04-24 | End: 2019-04-26

## 2019-04-24 ENCOUNTER — RESULT REVIEW (OUTPATIENT)
Age: 59
End: 2019-04-24

## 2019-04-24 ENCOUNTER — APPOINTMENT (OUTPATIENT)
Dept: COLORECTAL SURGERY | Facility: HOSPITAL | Age: 59
End: 2019-04-24
Payer: COMMERCIAL

## 2019-04-24 ENCOUNTER — INPATIENT (INPATIENT)
Facility: HOSPITAL | Age: 59
LOS: 2 days | Discharge: HOME | End: 2019-04-27
Attending: COLON & RECTAL SURGERY | Admitting: COLON & RECTAL SURGERY
Payer: COMMERCIAL

## 2019-04-24 VITALS
SYSTOLIC BLOOD PRESSURE: 134 MMHG | TEMPERATURE: 99 F | OXYGEN SATURATION: 97 % | DIASTOLIC BLOOD PRESSURE: 81 MMHG | WEIGHT: 229.94 LBS | RESPIRATION RATE: 16 BRPM | HEART RATE: 70 BPM | HEIGHT: 73 IN

## 2019-04-24 DIAGNOSIS — Z98.890 OTHER SPECIFIED POSTPROCEDURAL STATES: Chronic | ICD-10-CM

## 2019-04-24 LAB
GLUCOSE BLDC GLUCOMTR-MCNC: 112 MG/DL — HIGH (ref 70–99)
GLUCOSE BLDC GLUCOMTR-MCNC: 119 MG/DL — HIGH (ref 70–99)
GLUCOSE BLDC GLUCOMTR-MCNC: 80 MG/DL — SIGNIFICANT CHANGE UP (ref 70–99)
GLUCOSE BLDC GLUCOMTR-MCNC: 92 MG/DL — SIGNIFICANT CHANGE UP (ref 70–99)

## 2019-04-24 PROCEDURE — 44213 LAP MOBIL SPLENIC FL ADD-ON: CPT | Mod: AS

## 2019-04-24 PROCEDURE — 44213 LAP MOBIL SPLENIC FL ADD-ON: CPT

## 2019-04-24 PROCEDURE — 45300 PROCTOSIGMOIDOSCOPY DX: CPT

## 2019-04-24 PROCEDURE — 44204 LAPARO PARTIAL COLECTOMY: CPT | Mod: AS

## 2019-04-24 PROCEDURE — 44204 LAPARO PARTIAL COLECTOMY: CPT

## 2019-04-24 PROCEDURE — 88307 TISSUE EXAM BY PATHOLOGIST: CPT | Mod: 26

## 2019-04-24 RX ORDER — ALVIMOPAN 12 MG/1
12 CAPSULE ORAL ONCE
Qty: 0 | Refills: 0 | Status: COMPLETED | OUTPATIENT
Start: 2019-04-24 | End: 2019-04-24

## 2019-04-24 RX ORDER — FAMOTIDINE 10 MG/ML
20 INJECTION INTRAVENOUS ONCE
Qty: 0 | Refills: 0 | Status: COMPLETED | OUTPATIENT
Start: 2019-04-24 | End: 2019-04-24

## 2019-04-24 RX ORDER — OXYCODONE HYDROCHLORIDE 5 MG/1
10 TABLET ORAL ONCE
Qty: 0 | Refills: 0 | Status: DISCONTINUED | OUTPATIENT
Start: 2019-04-24 | End: 2019-04-24

## 2019-04-24 RX ORDER — SODIUM CHLORIDE 9 MG/ML
1000 INJECTION, SOLUTION INTRAVENOUS
Qty: 0 | Refills: 0 | Status: DISCONTINUED | OUTPATIENT
Start: 2019-04-24 | End: 2019-04-24

## 2019-04-24 RX ORDER — ZOLPIDEM TARTRATE 10 MG/1
5 TABLET ORAL AT BEDTIME
Qty: 0 | Refills: 0 | Status: DISCONTINUED | OUTPATIENT
Start: 2019-04-24 | End: 2019-04-27

## 2019-04-24 RX ORDER — HEPARIN SODIUM 5000 [USP'U]/ML
5000 INJECTION INTRAVENOUS; SUBCUTANEOUS ONCE
Qty: 0 | Refills: 0 | Status: COMPLETED | OUTPATIENT
Start: 2019-04-24 | End: 2019-04-24

## 2019-04-24 RX ORDER — PIPERACILLIN AND TAZOBACTAM 4; .5 G/20ML; G/20ML
3.38 INJECTION, POWDER, LYOPHILIZED, FOR SOLUTION INTRAVENOUS EVERY 8 HOURS
Qty: 0 | Refills: 0 | Status: DISCONTINUED | OUTPATIENT
Start: 2019-04-24 | End: 2019-04-27

## 2019-04-24 RX ORDER — SODIUM CHLORIDE 9 MG/ML
1000 INJECTION INTRAMUSCULAR; INTRAVENOUS; SUBCUTANEOUS
Qty: 0 | Refills: 0 | Status: DISCONTINUED | OUTPATIENT
Start: 2019-04-24 | End: 2019-04-25

## 2019-04-24 RX ORDER — CARVEDILOL PHOSPHATE 80 MG/1
12.5 CAPSULE, EXTENDED RELEASE ORAL EVERY 12 HOURS
Qty: 0 | Refills: 0 | Status: DISCONTINUED | OUTPATIENT
Start: 2019-04-24 | End: 2019-04-27

## 2019-04-24 RX ORDER — CARVEDILOL PHOSPHATE 80 MG/1
1 CAPSULE, EXTENDED RELEASE ORAL
Qty: 0 | Refills: 0 | COMMUNITY

## 2019-04-24 RX ORDER — ONDANSETRON 8 MG/1
4 TABLET, FILM COATED ORAL ONCE
Qty: 0 | Refills: 0 | Status: DISCONTINUED | OUTPATIENT
Start: 2019-04-24 | End: 2019-04-24

## 2019-04-24 RX ORDER — ENOXAPARIN SODIUM 100 MG/ML
40 INJECTION SUBCUTANEOUS DAILY
Qty: 0 | Refills: 0 | Status: DISCONTINUED | OUTPATIENT
Start: 2019-04-24 | End: 2019-04-27

## 2019-04-24 RX ORDER — ACETAMINOPHEN 500 MG
975 TABLET ORAL ONCE
Qty: 0 | Refills: 0 | Status: COMPLETED | OUTPATIENT
Start: 2019-04-24 | End: 2019-04-24

## 2019-04-24 RX ORDER — MEPERIDINE HYDROCHLORIDE 50 MG/ML
12.5 INJECTION INTRAMUSCULAR; INTRAVENOUS; SUBCUTANEOUS
Qty: 0 | Refills: 0 | Status: DISCONTINUED | OUTPATIENT
Start: 2019-04-24 | End: 2019-04-24

## 2019-04-24 RX ORDER — SODIUM CHLORIDE 9 MG/ML
1000 INJECTION, SOLUTION INTRAVENOUS
Qty: 0 | Refills: 0 | Status: DISCONTINUED | OUTPATIENT
Start: 2019-04-24 | End: 2019-04-25

## 2019-04-24 RX ORDER — ACETAMINOPHEN 500 MG
1000 TABLET ORAL ONCE
Qty: 0 | Refills: 0 | Status: COMPLETED | OUTPATIENT
Start: 2019-04-24 | End: 2019-04-24

## 2019-04-24 RX ADMIN — SODIUM CHLORIDE 3 MILLILITER(S): 9 INJECTION INTRAMUSCULAR; INTRAVENOUS; SUBCUTANEOUS at 22:45

## 2019-04-24 RX ADMIN — CARVEDILOL PHOSPHATE 12.5 MILLIGRAM(S): 80 CAPSULE, EXTENDED RELEASE ORAL at 22:48

## 2019-04-24 RX ADMIN — Medication 1000 MILLIGRAM(S): at 22:20

## 2019-04-24 RX ADMIN — OXYCODONE HYDROCHLORIDE 10 MILLIGRAM(S): 5 TABLET ORAL at 11:24

## 2019-04-24 RX ADMIN — HYDROMORPHONE HYDROCHLORIDE 30 MILLILITER(S): 2 INJECTION INTRAMUSCULAR; INTRAVENOUS; SUBCUTANEOUS at 16:59

## 2019-04-24 RX ADMIN — Medication 975 MILLIGRAM(S): at 11:23

## 2019-04-24 RX ADMIN — Medication 400 MILLIGRAM(S): at 22:05

## 2019-04-24 RX ADMIN — PIPERACILLIN AND TAZOBACTAM 25 GRAM(S): 4; .5 INJECTION, POWDER, LYOPHILIZED, FOR SOLUTION INTRAVENOUS at 22:48

## 2019-04-24 RX ADMIN — FAMOTIDINE 20 MILLIGRAM(S): 10 INJECTION INTRAVENOUS at 11:23

## 2019-04-24 RX ADMIN — HEPARIN SODIUM 5000 UNIT(S): 5000 INJECTION INTRAVENOUS; SUBCUTANEOUS at 11:23

## 2019-04-24 RX ADMIN — OXYCODONE HYDROCHLORIDE 10 MILLIGRAM(S): 5 TABLET ORAL at 11:23

## 2019-04-24 RX ADMIN — ALVIMOPAN 12 MILLIGRAM(S): 12 CAPSULE ORAL at 11:23

## 2019-04-24 NOTE — PATIENT PROFILE ADULT - NSPROPTRIGHTCAREGIVER_GEN_A_NUR
Problem: Device-Related Complication Risk (Hemodialysis)  Goal: Safe, Effective Therapy Delivery  Outcome: Ongoing (interventions implemented as appropriate)  Intervention: Optimize Device Care and Function  Alert  Denies pain/discomfort  Discussed intake and output monitoring and it's importance  Daily weight  Will monitor lab results  Glucose monitoring  24 hour urine in process  Sinus rhythm  Safe          declines

## 2019-04-24 NOTE — BRIEF OPERATIVE NOTE - OPERATION/FINDINGS
Left ureter identified and preserved within retroperitoneum; tension free, well vascularized, properly oriented EEA 28mm end to end anastomosis created; air leak test negative x 3, mucosa proximal and distal to anastomosis healthy

## 2019-04-24 NOTE — BRIEF OPERATIVE NOTE - NSICDXBRIEFPROCEDURE_GEN_ALL_CORE_FT
PROCEDURES:  Proctosigmoidoscopy, diagnostic, rigid 24-Apr-2019 16:27:41  Mariam Avila  Mobilization, splenic flexure, laparoscopic 24-Apr-2019 16:27:33  Mariam Avila  Colectomy, sigmoid, partial, laparoscopic, with anastomosis 24-Apr-2019 16:27:24  Mariam Avila

## 2019-04-25 LAB
ALBUMIN SERPL ELPH-MCNC: 3.2 G/DL — LOW (ref 3.3–5)
ALP SERPL-CCNC: 61 U/L — SIGNIFICANT CHANGE UP (ref 40–120)
ALT FLD-CCNC: 29 U/L — SIGNIFICANT CHANGE UP (ref 12–78)
ANION GAP SERPL CALC-SCNC: 7 MMOL/L — SIGNIFICANT CHANGE UP (ref 5–17)
AST SERPL-CCNC: 17 U/L — SIGNIFICANT CHANGE UP (ref 15–37)
BILIRUB SERPL-MCNC: 0.5 MG/DL — SIGNIFICANT CHANGE UP (ref 0.2–1.2)
BUN SERPL-MCNC: 17 MG/DL — SIGNIFICANT CHANGE UP (ref 7–23)
CALCIUM SERPL-MCNC: 8 MG/DL — LOW (ref 8.5–10.1)
CHLORIDE SERPL-SCNC: 105 MMOL/L — SIGNIFICANT CHANGE UP (ref 96–108)
CO2 SERPL-SCNC: 28 MMOL/L — SIGNIFICANT CHANGE UP (ref 22–31)
CREAT SERPL-MCNC: 0.9 MG/DL — SIGNIFICANT CHANGE UP (ref 0.5–1.3)
GLUCOSE BLDC GLUCOMTR-MCNC: 116 MG/DL — HIGH (ref 70–99)
GLUCOSE BLDC GLUCOMTR-MCNC: 129 MG/DL — HIGH (ref 70–99)
GLUCOSE BLDC GLUCOMTR-MCNC: 142 MG/DL — HIGH (ref 70–99)
GLUCOSE BLDC GLUCOMTR-MCNC: 166 MG/DL — HIGH (ref 70–99)
GLUCOSE SERPL-MCNC: 143 MG/DL — HIGH (ref 70–99)
HCT VFR BLD CALC: 38.5 % — LOW (ref 39–50)
HGB BLD-MCNC: 12.6 G/DL — LOW (ref 13–17)
MCHC RBC-ENTMCNC: 29.2 PG — SIGNIFICANT CHANGE UP (ref 27–34)
MCHC RBC-ENTMCNC: 32.7 GM/DL — SIGNIFICANT CHANGE UP (ref 32–36)
MCV RBC AUTO: 89.1 FL — SIGNIFICANT CHANGE UP (ref 80–100)
NRBC # BLD: 0 /100 WBCS — SIGNIFICANT CHANGE UP (ref 0–0)
PLATELET # BLD AUTO: 201 K/UL — SIGNIFICANT CHANGE UP (ref 150–400)
POTASSIUM SERPL-MCNC: 4.2 MMOL/L — SIGNIFICANT CHANGE UP (ref 3.5–5.3)
POTASSIUM SERPL-SCNC: 4.2 MMOL/L — SIGNIFICANT CHANGE UP (ref 3.5–5.3)
PROT SERPL-MCNC: 6.3 GM/DL — SIGNIFICANT CHANGE UP (ref 6–8.3)
RBC # BLD: 4.32 M/UL — SIGNIFICANT CHANGE UP (ref 4.2–5.8)
RBC # FLD: 14.3 % — SIGNIFICANT CHANGE UP (ref 10.3–14.5)
SODIUM SERPL-SCNC: 140 MMOL/L — SIGNIFICANT CHANGE UP (ref 135–145)
WBC # BLD: 10.39 K/UL — SIGNIFICANT CHANGE UP (ref 3.8–10.5)
WBC # FLD AUTO: 10.39 K/UL — SIGNIFICANT CHANGE UP (ref 3.8–10.5)

## 2019-04-25 RX ORDER — FAMOTIDINE 10 MG/ML
20 INJECTION INTRAVENOUS
Qty: 0 | Refills: 0 | Status: DISCONTINUED | OUTPATIENT
Start: 2019-04-25 | End: 2019-04-27

## 2019-04-25 RX ORDER — ACETAMINOPHEN 500 MG
1000 TABLET ORAL ONCE
Qty: 0 | Refills: 0 | Status: COMPLETED | OUTPATIENT
Start: 2019-04-25 | End: 2019-04-25

## 2019-04-25 RX ORDER — SODIUM CHLORIDE 9 MG/ML
1000 INJECTION, SOLUTION INTRAVENOUS
Qty: 0 | Refills: 0 | Status: DISCONTINUED | OUTPATIENT
Start: 2019-04-25 | End: 2019-04-26

## 2019-04-25 RX ORDER — KETOROLAC TROMETHAMINE 30 MG/ML
15 SYRINGE (ML) INJECTION EVERY 6 HOURS
Qty: 0 | Refills: 0 | Status: DISCONTINUED | OUTPATIENT
Start: 2019-04-25 | End: 2019-04-27

## 2019-04-25 RX ADMIN — SODIUM CHLORIDE 3 MILLILITER(S): 9 INJECTION INTRAMUSCULAR; INTRAVENOUS; SUBCUTANEOUS at 21:29

## 2019-04-25 RX ADMIN — PIPERACILLIN AND TAZOBACTAM 25 GRAM(S): 4; .5 INJECTION, POWDER, LYOPHILIZED, FOR SOLUTION INTRAVENOUS at 13:13

## 2019-04-25 RX ADMIN — ZOLPIDEM TARTRATE 5 MILLIGRAM(S): 10 TABLET ORAL at 23:31

## 2019-04-25 RX ADMIN — Medication 15 MILLIGRAM(S): at 11:19

## 2019-04-25 RX ADMIN — PIPERACILLIN AND TAZOBACTAM 25 GRAM(S): 4; .5 INJECTION, POWDER, LYOPHILIZED, FOR SOLUTION INTRAVENOUS at 05:16

## 2019-04-25 RX ADMIN — PIPERACILLIN AND TAZOBACTAM 25 GRAM(S): 4; .5 INJECTION, POWDER, LYOPHILIZED, FOR SOLUTION INTRAVENOUS at 22:19

## 2019-04-25 RX ADMIN — Medication 15 MILLIGRAM(S): at 23:31

## 2019-04-25 RX ADMIN — ENOXAPARIN SODIUM 40 MILLIGRAM(S): 100 INJECTION SUBCUTANEOUS at 11:17

## 2019-04-25 RX ADMIN — FAMOTIDINE 20 MILLIGRAM(S): 10 INJECTION INTRAVENOUS at 20:04

## 2019-04-25 RX ADMIN — CARVEDILOL PHOSPHATE 12.5 MILLIGRAM(S): 80 CAPSULE, EXTENDED RELEASE ORAL at 17:24

## 2019-04-25 RX ADMIN — SODIUM CHLORIDE 3 MILLILITER(S): 9 INJECTION INTRAMUSCULAR; INTRAVENOUS; SUBCUTANEOUS at 05:16

## 2019-04-25 RX ADMIN — ONDANSETRON 4 MILLIGRAM(S): 8 TABLET, FILM COATED ORAL at 14:09

## 2019-04-25 RX ADMIN — SODIUM CHLORIDE 3 MILLILITER(S): 9 INJECTION INTRAMUSCULAR; INTRAVENOUS; SUBCUTANEOUS at 14:02

## 2019-04-25 RX ADMIN — Medication 15 MILLIGRAM(S): at 17:24

## 2019-04-25 RX ADMIN — Medication 400 MILLIGRAM(S): at 08:49

## 2019-04-25 RX ADMIN — Medication 1000 MILLIGRAM(S): at 09:19

## 2019-04-25 RX ADMIN — Medication 15 MILLIGRAM(S): at 17:25

## 2019-04-25 RX ADMIN — Medication 15 MILLIGRAM(S): at 11:17

## 2019-04-25 NOTE — PROGRESS NOTE ADULT - SUBJECTIVE AND OBJECTIVE BOX
Patient seen and examined at the bedside with surgery team .Pain is well controlled. No acute events overnight. Tolerated surgery well. Vitals have been stable overnight. No major post operative concerns or issues.                           12.6   10.39 )-----------( 201      ( 25 Apr 2019 07:06 )             38.5     04-25    140  |  105  |  17  ----------------------------<  143<H>  4.2   |  28  |  0.90    Ca    8.0<L>      25 Apr 2019 07:06    TPro  6.3  /  Alb  3.2<L>  /  TBili  0.5  /  DBili  x   /  AST  17  /  ALT  29  /  AlkPhos  61  04-25    ICU Vital Signs Last 24 Hrs  T(C): 36.6 (25 Apr 2019 04:35), Max: 37.2 (24 Apr 2019 11:02)  T(F): 97.8 (25 Apr 2019 04:35), Max: 98.9 (24 Apr 2019 11:02)  HR: 93 (25 Apr 2019 04:35) (70 - 97)  BP: 105/65 (25 Apr 2019 04:35) (105/65 - 140/87)  BP(mean): --  ABP: --  ABP(mean): --  RR: 18 (25 Apr 2019 04:35) (14 - 18)  SpO2: 96% (25 Apr 2019 04:35) (94% - 100%)    Physical exam:     gen: aoo x3  pulm: equal air entry bilaterally  cv: [id number]  abdomen: soft, nondistended, incision site tender

## 2019-04-25 NOTE — PROGRESS NOTE ADULT - ASSESSMENT
59M s/p laparoscopic sigmoidectomy POD1    - pain control  - remove logan  - daily labs  - continue gi and dvt ppx  - continue abx for 2 more days  - out of bed to chair and ambulate  - continue use of IS  - continue ivf    plan will be discussed with

## 2019-04-26 LAB
ANION GAP SERPL CALC-SCNC: 6 MMOL/L — SIGNIFICANT CHANGE UP (ref 5–17)
BUN SERPL-MCNC: 15 MG/DL — SIGNIFICANT CHANGE UP (ref 7–23)
CALCIUM SERPL-MCNC: 8.2 MG/DL — LOW (ref 8.5–10.1)
CHLORIDE SERPL-SCNC: 102 MMOL/L — SIGNIFICANT CHANGE UP (ref 96–108)
CO2 SERPL-SCNC: 29 MMOL/L — SIGNIFICANT CHANGE UP (ref 22–31)
CREAT SERPL-MCNC: 0.74 MG/DL — SIGNIFICANT CHANGE UP (ref 0.5–1.3)
GLUCOSE BLDC GLUCOMTR-MCNC: 87 MG/DL — SIGNIFICANT CHANGE UP (ref 70–99)
GLUCOSE BLDC GLUCOMTR-MCNC: 92 MG/DL — SIGNIFICANT CHANGE UP (ref 70–99)
GLUCOSE SERPL-MCNC: 91 MG/DL — SIGNIFICANT CHANGE UP (ref 70–99)
HCT VFR BLD CALC: 36 % — LOW (ref 39–50)
HGB BLD-MCNC: 11.5 G/DL — LOW (ref 13–17)
MAGNESIUM SERPL-MCNC: 2.1 MG/DL — SIGNIFICANT CHANGE UP (ref 1.6–2.6)
MCHC RBC-ENTMCNC: 29.3 PG — SIGNIFICANT CHANGE UP (ref 27–34)
MCHC RBC-ENTMCNC: 31.9 GM/DL — LOW (ref 32–36)
MCV RBC AUTO: 91.6 FL — SIGNIFICANT CHANGE UP (ref 80–100)
NRBC # BLD: 0 /100 WBCS — SIGNIFICANT CHANGE UP (ref 0–0)
PHOSPHATE SERPL-MCNC: 3.1 MG/DL — SIGNIFICANT CHANGE UP (ref 2.5–4.5)
PLATELET # BLD AUTO: 175 K/UL — SIGNIFICANT CHANGE UP (ref 150–400)
POTASSIUM SERPL-MCNC: 3.8 MMOL/L — SIGNIFICANT CHANGE UP (ref 3.5–5.3)
POTASSIUM SERPL-SCNC: 3.8 MMOL/L — SIGNIFICANT CHANGE UP (ref 3.5–5.3)
RBC # BLD: 3.93 M/UL — LOW (ref 4.2–5.8)
RBC # FLD: 14.5 % — SIGNIFICANT CHANGE UP (ref 10.3–14.5)
SODIUM SERPL-SCNC: 137 MMOL/L — SIGNIFICANT CHANGE UP (ref 135–145)
WBC # BLD: 6.56 K/UL — SIGNIFICANT CHANGE UP (ref 3.8–10.5)
WBC # FLD AUTO: 6.56 K/UL — SIGNIFICANT CHANGE UP (ref 3.8–10.5)

## 2019-04-26 RX ORDER — OXYCODONE AND ACETAMINOPHEN 5; 325 MG/1; MG/1
1 TABLET ORAL EVERY 4 HOURS
Qty: 0 | Refills: 0 | Status: DISCONTINUED | OUTPATIENT
Start: 2019-04-26 | End: 2019-04-26

## 2019-04-26 RX ORDER — OXYCODONE AND ACETAMINOPHEN 5; 325 MG/1; MG/1
2 TABLET ORAL EVERY 4 HOURS
Qty: 0 | Refills: 0 | Status: DISCONTINUED | OUTPATIENT
Start: 2019-04-26 | End: 2019-04-26

## 2019-04-26 RX ORDER — OXYCODONE AND ACETAMINOPHEN 5; 325 MG/1; MG/1
2 TABLET ORAL EVERY 6 HOURS
Qty: 0 | Refills: 0 | Status: DISCONTINUED | OUTPATIENT
Start: 2019-04-26 | End: 2019-04-27

## 2019-04-26 RX ORDER — OXYCODONE AND ACETAMINOPHEN 5; 325 MG/1; MG/1
1 TABLET ORAL EVERY 6 HOURS
Qty: 0 | Refills: 0 | Status: DISCONTINUED | OUTPATIENT
Start: 2019-04-26 | End: 2019-04-27

## 2019-04-26 RX ORDER — POTASSIUM CHLORIDE 20 MEQ
40 PACKET (EA) ORAL ONCE
Qty: 0 | Refills: 0 | Status: COMPLETED | OUTPATIENT
Start: 2019-04-26 | End: 2019-04-26

## 2019-04-26 RX ADMIN — Medication 15 MILLIGRAM(S): at 20:09

## 2019-04-26 RX ADMIN — Medication 15 MILLIGRAM(S): at 18:43

## 2019-04-26 RX ADMIN — ENOXAPARIN SODIUM 40 MILLIGRAM(S): 100 INJECTION SUBCUTANEOUS at 11:39

## 2019-04-26 RX ADMIN — CARVEDILOL PHOSPHATE 12.5 MILLIGRAM(S): 80 CAPSULE, EXTENDED RELEASE ORAL at 18:43

## 2019-04-26 RX ADMIN — PIPERACILLIN AND TAZOBACTAM 25 GRAM(S): 4; .5 INJECTION, POWDER, LYOPHILIZED, FOR SOLUTION INTRAVENOUS at 15:16

## 2019-04-26 RX ADMIN — OXYCODONE AND ACETAMINOPHEN 2 TABLET(S): 5; 325 TABLET ORAL at 23:31

## 2019-04-26 RX ADMIN — Medication 15 MILLIGRAM(S): at 05:48

## 2019-04-26 RX ADMIN — OXYCODONE AND ACETAMINOPHEN 2 TABLET(S): 5; 325 TABLET ORAL at 11:37

## 2019-04-26 RX ADMIN — OXYCODONE AND ACETAMINOPHEN 1 TABLET(S): 5; 325 TABLET ORAL at 20:09

## 2019-04-26 RX ADMIN — PIPERACILLIN AND TAZOBACTAM 25 GRAM(S): 4; .5 INJECTION, POWDER, LYOPHILIZED, FOR SOLUTION INTRAVENOUS at 05:48

## 2019-04-26 RX ADMIN — SODIUM CHLORIDE 3 MILLILITER(S): 9 INJECTION INTRAMUSCULAR; INTRAVENOUS; SUBCUTANEOUS at 05:10

## 2019-04-26 RX ADMIN — CARVEDILOL PHOSPHATE 12.5 MILLIGRAM(S): 80 CAPSULE, EXTENDED RELEASE ORAL at 05:48

## 2019-04-26 RX ADMIN — OXYCODONE AND ACETAMINOPHEN 1 TABLET(S): 5; 325 TABLET ORAL at 12:09

## 2019-04-26 RX ADMIN — OXYCODONE AND ACETAMINOPHEN 2 TABLET(S): 5; 325 TABLET ORAL at 14:50

## 2019-04-26 RX ADMIN — Medication 15 MILLIGRAM(S): at 14:50

## 2019-04-26 RX ADMIN — Medication 40 MILLIEQUIVALENT(S): at 10:15

## 2019-04-26 RX ADMIN — Medication 15 MILLIGRAM(S): at 23:30

## 2019-04-26 RX ADMIN — OXYCODONE AND ACETAMINOPHEN 1 TABLET(S): 5; 325 TABLET ORAL at 18:43

## 2019-04-26 RX ADMIN — OXYCODONE AND ACETAMINOPHEN 1 TABLET(S): 5; 325 TABLET ORAL at 18:49

## 2019-04-26 RX ADMIN — Medication 15 MILLIGRAM(S): at 23:33

## 2019-04-26 RX ADMIN — SODIUM CHLORIDE 3 MILLILITER(S): 9 INJECTION INTRAMUSCULAR; INTRAVENOUS; SUBCUTANEOUS at 22:32

## 2019-04-26 RX ADMIN — PIPERACILLIN AND TAZOBACTAM 25 GRAM(S): 4; .5 INJECTION, POWDER, LYOPHILIZED, FOR SOLUTION INTRAVENOUS at 23:30

## 2019-04-26 RX ADMIN — Medication 15 MILLIGRAM(S): at 11:39

## 2019-04-26 RX ADMIN — SODIUM CHLORIDE 3 MILLILITER(S): 9 INJECTION INTRAMUSCULAR; INTRAVENOUS; SUBCUTANEOUS at 18:10

## 2019-04-26 NOTE — PROGRESS NOTE ADULT - ASSESSMENT
POD 2 Lap sigmoid    D/C PCA. Prn percocet.  On coreg.  Encourage IS and OOB.  Adv to low fiber diet. Cautioned to proceed slowly.  Stable postop Hgb, on lovenox.  Afebrile, WBC normal, on zosyn for intraop purulence.  HLIV.

## 2019-04-26 NOTE — PROGRESS NOTE ADULT - SUBJECTIVE AND OBJECTIVE BOX
No acute issues. Passing large amounts of gas. Radhika clear liquids without N/V/abd pain or distention.    MEDICATIONS  (STANDING):  carvedilol 12.5 milliGRAM(s) Oral every 12 hours  enoxaparin Injectable 40 milliGRAM(s) SubCutaneous daily  HYDROmorphone PCA (1 mG/mL) 30 milliLiter(s) PCA Continuous PCA Continuous  ketorolac   Injectable 15 milliGRAM(s) IV Push every 6 hours  lactated ringers. 1000 milliLiter(s) (80 mL/Hr) IV Continuous <Continuous>  piperacillin/tazobactam IVPB. 3.375 Gram(s) IV Intermittent every 8 hours  potassium chloride    Tablet ER 40 milliEquivalent(s) Oral once  sodium chloride 0.9% lock flush 3 milliLiter(s) IV Push every 8 hours    MEDICATIONS  (PRN):  famotidine    Tablet 20 milliGRAM(s) Oral two times a day PRN as needed for acid reflux  HYDROmorphone PCA (1 mG/mL) Rescue Clinician Bolus 0.5 milliGRAM(s) IV Push every 15 minutes PRN for Pain Scale GREATER THAN 6  naloxone Injectable 0.1 milliGRAM(s) IV Push every 3 minutes PRN For ANY of the following changes in patient status:  A. RR LESS THAN 10 breaths per minute, B. Oxygen saturation LESS THAN 90%, C. Sedation score of 6  ondansetron Injectable 4 milliGRAM(s) IV Push every 6 hours PRN Nausea  zolpidem 5 milliGRAM(s) Oral at bedtime PRN Insomnia  zolpidem 5 milliGRAM(s) Oral at bedtime PRN Insomnia    Vital Signs Last 24 Hrs  T(C): 36.5 (26 Apr 2019 03:25), Max: 37.3 (25 Apr 2019 11:04)  T(F): 97.7 (26 Apr 2019 03:25), Max: 99.1 (25 Apr 2019 11:04)  HR: 71 (26 Apr 2019 03:25) (71 - 92)  BP: 154/96 (26 Apr 2019 03:25) (123/80 - 154/96)  BP(mean): --  RR: 18 (26 Apr 2019 03:25) (16 - 18)  SpO2: 98% (26 Apr 2019 03:25) (94% - 98%)  I&O's Detail    25 Apr 2019 07:01  -  26 Apr 2019 07:00  --------------------------------------------------------  IN:    IV PiggyBack: 200 mL    Oral Fluid: 250 mL  Total IN: 450 mL    OUT:    Indwelling Catheter - Urethral: 350 mL    Voided: 3300 mL  Total OUT: 3650 mL    Total NET: -3200 mL      26 Apr 2019 07:01  -  26 Apr 2019 10:12  --------------------------------------------------------  IN:  Total IN: 0 mL    OUT:    Voided: 1000 mL  Total OUT: 1000 mL    Total NET: -1000 mL    NAD  ABD exam : desirae in place, soft, mild tenderness and distention                        11.5   6.56  )-----------( 175      ( 26 Apr 2019 07:11 )             36.0     04-26    137  |  102  |  15  ----------------------------<  91  3.8   |  29  |  0.74    Ca    8.2<L>      26 Apr 2019 07:11  Phos  3.1     04-26  Mg     2.1     04-26    TPro  6.3  /  Alb  3.2<L>  /  TBili  0.5  /  DBili  x   /  AST  17  /  ALT  29  /  AlkPhos  61  04-25

## 2019-04-27 ENCOUNTER — TRANSCRIPTION ENCOUNTER (OUTPATIENT)
Age: 59
End: 2019-04-27

## 2019-04-27 VITALS
OXYGEN SATURATION: 97 % | SYSTOLIC BLOOD PRESSURE: 150 MMHG | RESPIRATION RATE: 16 BRPM | DIASTOLIC BLOOD PRESSURE: 95 MMHG | TEMPERATURE: 97 F | HEART RATE: 75 BPM

## 2019-04-27 LAB
ANION GAP SERPL CALC-SCNC: 4 MMOL/L — LOW (ref 5–17)
BUN SERPL-MCNC: 10 MG/DL — SIGNIFICANT CHANGE UP (ref 7–23)
CALCIUM SERPL-MCNC: 8.4 MG/DL — LOW (ref 8.5–10.1)
CHLORIDE SERPL-SCNC: 103 MMOL/L — SIGNIFICANT CHANGE UP (ref 96–108)
CO2 SERPL-SCNC: 32 MMOL/L — HIGH (ref 22–31)
CREAT SERPL-MCNC: 0.72 MG/DL — SIGNIFICANT CHANGE UP (ref 0.5–1.3)
GLUCOSE SERPL-MCNC: 88 MG/DL — SIGNIFICANT CHANGE UP (ref 70–99)
HCT VFR BLD CALC: 37.7 % — LOW (ref 39–50)
HGB BLD-MCNC: 12.3 G/DL — LOW (ref 13–17)
MAGNESIUM SERPL-MCNC: 2.1 MG/DL — SIGNIFICANT CHANGE UP (ref 1.6–2.6)
MCHC RBC-ENTMCNC: 29.5 PG — SIGNIFICANT CHANGE UP (ref 27–34)
MCHC RBC-ENTMCNC: 32.6 GM/DL — SIGNIFICANT CHANGE UP (ref 32–36)
MCV RBC AUTO: 90.4 FL — SIGNIFICANT CHANGE UP (ref 80–100)
NRBC # BLD: 0 /100 WBCS — SIGNIFICANT CHANGE UP (ref 0–0)
PHOSPHATE SERPL-MCNC: 4.2 MG/DL — SIGNIFICANT CHANGE UP (ref 2.5–4.5)
PLATELET # BLD AUTO: 191 K/UL — SIGNIFICANT CHANGE UP (ref 150–400)
POTASSIUM SERPL-MCNC: 4 MMOL/L — SIGNIFICANT CHANGE UP (ref 3.5–5.3)
POTASSIUM SERPL-SCNC: 4 MMOL/L — SIGNIFICANT CHANGE UP (ref 3.5–5.3)
RBC # BLD: 4.17 M/UL — LOW (ref 4.2–5.8)
RBC # FLD: 14.4 % — SIGNIFICANT CHANGE UP (ref 10.3–14.5)
SODIUM SERPL-SCNC: 139 MMOL/L — SIGNIFICANT CHANGE UP (ref 135–145)
WBC # BLD: 5.07 K/UL — SIGNIFICANT CHANGE UP (ref 3.8–10.5)
WBC # FLD AUTO: 5.07 K/UL — SIGNIFICANT CHANGE UP (ref 3.8–10.5)

## 2019-04-27 RX ORDER — KETOROLAC TROMETHAMINE 30 MG/ML
1 SYRINGE (ML) INJECTION
Qty: 20 | Refills: 0 | OUTPATIENT
Start: 2019-04-27 | End: 2019-05-01

## 2019-04-27 RX ADMIN — ZOLPIDEM TARTRATE 5 MILLIGRAM(S): 10 TABLET ORAL at 01:30

## 2019-04-27 RX ADMIN — SODIUM CHLORIDE 3 MILLILITER(S): 9 INJECTION INTRAMUSCULAR; INTRAVENOUS; SUBCUTANEOUS at 05:10

## 2019-04-27 RX ADMIN — CARVEDILOL PHOSPHATE 12.5 MILLIGRAM(S): 80 CAPSULE, EXTENDED RELEASE ORAL at 05:19

## 2019-04-27 RX ADMIN — Medication 15 MILLIGRAM(S): at 05:19

## 2019-04-27 RX ADMIN — OXYCODONE AND ACETAMINOPHEN 2 TABLET(S): 5; 325 TABLET ORAL at 00:01

## 2019-04-27 RX ADMIN — PIPERACILLIN AND TAZOBACTAM 25 GRAM(S): 4; .5 INJECTION, POWDER, LYOPHILIZED, FOR SOLUTION INTRAVENOUS at 05:22

## 2019-04-27 RX ADMIN — OXYCODONE AND ACETAMINOPHEN 2 TABLET(S): 5; 325 TABLET ORAL at 05:22

## 2019-04-27 RX ADMIN — Medication 15 MILLIGRAM(S): at 05:23

## 2019-04-27 NOTE — DISCHARGE NOTE NURSING/CASE MANAGEMENT/SOCIAL WORK - NSDCDPATPORTLINK_GEN_ALL_CORE
You can access the [facility] Patient Portal, offered by [facility], by registering with the following website: [email or web address]

## 2019-04-27 NOTE — DISCHARGE NOTE PROVIDER - HOSPITAL COURSE
Mr. Ruiz underwent a laparoscopic sigmoid resection on 4/24/19. On POD1, he was started on clear liquids and logan was removed. On POD 2, he was passing gas and was started on low fiber diet. The PCA was discontinued. By POD 3, he was passing gas and stool, was tolerating a diet and pain was controlled. His vitals and labwork were all WNL. As such, he was deemed stable for discharge.

## 2019-04-27 NOTE — DISCHARGE NOTE PROVIDER - CARE PROVIDER_API CALL
Mariam Avila)  ColonRectal Surgery; Surgery  [address] [place] [address]  Phone: [phone]  Fax: [phone]  Follow Up Time: 2 weeks

## 2019-04-27 NOTE — DISCHARGE NOTE PROVIDER - NSDCACTIVITY_GEN_ALL_CORE
Do not drive or operate machinery/Walking - Indoors allowed/Stairs allowed/Walking - Outdoors allowed/Showering allowed/No heavy lifting/straining

## 2019-04-29 LAB — SURGICAL PATHOLOGY STUDY: SIGNIFICANT CHANGE UP

## 2019-04-30 DIAGNOSIS — K57.80 DIVERTICULITIS OF INTESTINE, PART UNSPECIFIED, WITH PERFORATION AND ABSCESS WITHOUT BLEEDING: ICD-10-CM

## 2019-04-30 DIAGNOSIS — Z01.818 ENCOUNTER FOR OTHER PREPROCEDURAL EXAMINATION: ICD-10-CM

## 2019-05-01 DIAGNOSIS — I10 ESSENTIAL (PRIMARY) HYPERTENSION: ICD-10-CM

## 2019-05-01 DIAGNOSIS — K57.32 DIVERTICULITIS OF LARGE INTESTINE WITHOUT PERFORATION OR ABSCESS WITHOUT BLEEDING: ICD-10-CM

## 2019-07-19 ENCOUNTER — APPOINTMENT (OUTPATIENT)
Dept: COLORECTAL SURGERY | Facility: CLINIC | Age: 59
End: 2019-07-19

## 2019-09-05 ENCOUNTER — EMERGENCY (EMERGENCY)
Facility: HOSPITAL | Age: 59
LOS: 0 days | Discharge: HOME | End: 2019-09-06
Attending: EMERGENCY MEDICINE
Payer: COMMERCIAL

## 2019-09-05 VITALS — WEIGHT: 229.94 LBS | HEIGHT: 73 IN
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
## 2019-09-05 DIAGNOSIS — Z90.49 ACQUIRED ABSENCE OF OTHER SPECIFIED PARTS OF DIGESTIVE TRACT: ICD-10-CM

## 2019-09-05 DIAGNOSIS — R10.32 LEFT LOWER QUADRANT PAIN: ICD-10-CM

## 2019-09-05 DIAGNOSIS — I10 ESSENTIAL (PRIMARY) HYPERTENSION: ICD-10-CM

## 2019-09-05 DIAGNOSIS — M25.561 PAIN IN RIGHT KNEE: ICD-10-CM

## 2019-09-05 DIAGNOSIS — Z98.890 OTHER SPECIFIED POSTPROCEDURAL STATES: Chronic | ICD-10-CM
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
## 2019-09-05 LAB
ALBUMIN SERPL ELPH-MCNC: 3.5 G/DL — SIGNIFICANT CHANGE UP (ref 3.3–5)
ALP SERPL-CCNC: 68 U/L — SIGNIFICANT CHANGE UP (ref 40–120)
ALT FLD-CCNC: 35 U/L — SIGNIFICANT CHANGE UP (ref 12–78)
ANION GAP SERPL CALC-SCNC: 10 MMOL/L — SIGNIFICANT CHANGE UP (ref 5–17)
APPEARANCE UR: CLEAR — SIGNIFICANT CHANGE UP
AST SERPL-CCNC: 29 U/L — SIGNIFICANT CHANGE UP (ref 15–37)
BASOPHILS # BLD AUTO: 0.02 K/UL — SIGNIFICANT CHANGE UP (ref 0–0.2)
BASOPHILS NFR BLD AUTO: 0.5 % — SIGNIFICANT CHANGE UP (ref 0–2)
BILIRUB SERPL-MCNC: 0.3 MG/DL — SIGNIFICANT CHANGE UP (ref 0.2–1.2)
BILIRUB UR-MCNC: NEGATIVE — SIGNIFICANT CHANGE UP
BUN SERPL-MCNC: 16 MG/DL — SIGNIFICANT CHANGE UP (ref 7–23)
CALCIUM SERPL-MCNC: 8.8 MG/DL — SIGNIFICANT CHANGE UP (ref 8.5–10.1)
CHLORIDE SERPL-SCNC: 104 MMOL/L — SIGNIFICANT CHANGE UP (ref 96–108)
CO2 SERPL-SCNC: 24 MMOL/L — SIGNIFICANT CHANGE UP (ref 22–31)
COLOR SPEC: YELLOW — SIGNIFICANT CHANGE UP
CREAT SERPL-MCNC: 0.81 MG/DL — SIGNIFICANT CHANGE UP (ref 0.5–1.3)
DIFF PNL FLD: NEGATIVE — SIGNIFICANT CHANGE UP
EOSINOPHIL # BLD AUTO: 0.2 K/UL — SIGNIFICANT CHANGE UP (ref 0–0.5)
EOSINOPHIL NFR BLD AUTO: 4.5 % — SIGNIFICANT CHANGE UP (ref 0–6)
GLUCOSE SERPL-MCNC: 120 MG/DL — HIGH (ref 70–99)
GLUCOSE UR QL: NEGATIVE MG/DL — SIGNIFICANT CHANGE UP
HCT VFR BLD CALC: 41.6 % — SIGNIFICANT CHANGE UP (ref 39–50)
HGB BLD-MCNC: 13.9 G/DL — SIGNIFICANT CHANGE UP (ref 13–17)
IMM GRANULOCYTES NFR BLD AUTO: 0.2 % — SIGNIFICANT CHANGE UP (ref 0–1.5)
KETONES UR-MCNC: NEGATIVE — SIGNIFICANT CHANGE UP
LEUKOCYTE ESTERASE UR-ACNC: NEGATIVE — SIGNIFICANT CHANGE UP
LYMPHOCYTES # BLD AUTO: 1.23 K/UL — SIGNIFICANT CHANGE UP (ref 1–3.3)
LYMPHOCYTES # BLD AUTO: 28 % — SIGNIFICANT CHANGE UP (ref 13–44)
MCHC RBC-ENTMCNC: 30 PG — SIGNIFICANT CHANGE UP (ref 27–34)
MCHC RBC-ENTMCNC: 33.4 GM/DL — SIGNIFICANT CHANGE UP (ref 32–36)
MCV RBC AUTO: 89.8 FL — SIGNIFICANT CHANGE UP (ref 80–100)
MONOCYTES # BLD AUTO: 0.6 K/UL — SIGNIFICANT CHANGE UP (ref 0–0.9)
MONOCYTES NFR BLD AUTO: 13.6 % — SIGNIFICANT CHANGE UP (ref 2–14)
NEUTROPHILS # BLD AUTO: 2.34 K/UL — SIGNIFICANT CHANGE UP (ref 1.8–7.4)
NEUTROPHILS NFR BLD AUTO: 53.2 % — SIGNIFICANT CHANGE UP (ref 43–77)
NITRITE UR-MCNC: NEGATIVE — SIGNIFICANT CHANGE UP
PH UR: 6 — SIGNIFICANT CHANGE UP (ref 5–8)
PLATELET # BLD AUTO: 194 K/UL — SIGNIFICANT CHANGE UP (ref 150–400)
POTASSIUM SERPL-MCNC: 3.6 MMOL/L — SIGNIFICANT CHANGE UP (ref 3.5–5.3)
POTASSIUM SERPL-SCNC: 3.6 MMOL/L — SIGNIFICANT CHANGE UP (ref 3.5–5.3)
PROT SERPL-MCNC: 6.8 GM/DL — SIGNIFICANT CHANGE UP (ref 6–8.3)
PROT UR-MCNC: NEGATIVE MG/DL — SIGNIFICANT CHANGE UP
RBC # BLD: 4.63 M/UL — SIGNIFICANT CHANGE UP (ref 4.2–5.8)
RBC # FLD: 13.9 % — SIGNIFICANT CHANGE UP (ref 10.3–14.5)
SODIUM SERPL-SCNC: 138 MMOL/L — SIGNIFICANT CHANGE UP (ref 135–145)
SP GR SPEC: 1.01 — SIGNIFICANT CHANGE UP (ref 1.01–1.02)
UROBILINOGEN FLD QL: NEGATIVE MG/DL — SIGNIFICANT CHANGE UP
WBC # BLD: 4.4 K/UL — SIGNIFICANT CHANGE UP (ref 3.8–10.5)
WBC # FLD AUTO: 4.4 K/UL — SIGNIFICANT CHANGE UP (ref 3.8–10.5)

## 2019-09-05 PROCEDURE — 99284 EMERGENCY DEPT VISIT MOD MDM: CPT | Mod: 25

## 2019-09-05 PROCEDURE — 81003 URINALYSIS AUTO W/O SCOPE: CPT

## 2019-09-05 PROCEDURE — 71260 CT THORAX DX C+: CPT

## 2019-09-05 PROCEDURE — 73562 X-RAY EXAM OF KNEE 3: CPT | Mod: 26,RT

## 2019-09-05 PROCEDURE — 99053 MED SERV 10PM-8AM 24 HR FAC: CPT

## 2019-09-05 PROCEDURE — 73562 X-RAY EXAM OF KNEE 3: CPT | Mod: RT

## 2019-09-05 PROCEDURE — 80053 COMPREHEN METABOLIC PANEL: CPT

## 2019-09-05 PROCEDURE — 99284 EMERGENCY DEPT VISIT MOD MDM: CPT

## 2019-09-05 PROCEDURE — 85025 COMPLETE CBC W/AUTO DIFF WBC: CPT

## 2019-09-05 PROCEDURE — 74177 CT ABD & PELVIS W/CONTRAST: CPT | Mod: 26

## 2019-09-05 PROCEDURE — 36415 COLL VENOUS BLD VENIPUNCTURE: CPT

## 2019-09-05 PROCEDURE — 71260 CT THORAX DX C+: CPT | Mod: 26

## 2019-09-05 PROCEDURE — 74177 CT ABD & PELVIS W/CONTRAST: CPT

## 2019-09-05 RX ORDER — IBUPROFEN AND FAMOTIDINE 26.6; 8 MG/1; MG/1
1 TABLET, FILM COATED ORAL
Qty: 30 | Refills: 0
Start: 2019-09-05

## 2019-09-05 RX ORDER — ACETAMINOPHEN 500 MG
1000 TABLET ORAL ONCE
Refills: 0 | Status: COMPLETED | OUTPATIENT
Start: 2019-09-05 | End: 2019-09-05

## 2019-09-05 RX ADMIN — Medication 1000 MILLIGRAM(S): at 23:15
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
## 2019-09-05 NOTE — ED STATDOCS - PROGRESS NOTE ADDITIONAL1
Additional Progress Note...
Normal vision: sees adequately in most situations; can see medication labels, newsprint
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
## 2019-09-05 NOTE — ED ADULT NURSE NOTE - CHPI ED NUR SYMPTOMS NEG
no chills/no tingling/no nausea/no decreased eating/drinking/no fever/no weakness/no dizziness/no vomiting

## 2019-09-05 NOTE — ED STATDOCS - PATIENT PORTAL LINK FT
You can access the FollowMyHealth Patient Portal offered by [facility] by registering at the following website: [email or web address]. By joining [employer]’s FollowMyHealth portal, you will also be able to view your health information using other applications (apps) compatible with our system.

## 2019-09-05 NOTE — ED STATDOCS - CARE PLAN
Principal Discharge DX:	Left lower quadrant abdominal pain  Secondary Diagnosis:	Acute pain of right knee

## 2019-09-05 NOTE — ED STATDOCS - ATTENDING CONTRIBUTION TO CARE
Attending Contribution to Care: I, Radha Sun, performed the initial face to face bedside interview with this patient regarding history of present illness, review of symptoms and relevant past medical, social and family history.  I completed an independent physical examination.  I was the initial provider who evaluated this patient. I have signed out the follow up of any pending tests (i.e. labs, radiological studies) to the ACP.  I have communicated the patient’s plan of care and disposition with the ACP.
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
## 2019-09-06 VITALS
HEART RATE: 57 BPM | TEMPERATURE: 98 F | RESPIRATION RATE: 19 BRPM | SYSTOLIC BLOOD PRESSURE: 150 MMHG | OXYGEN SATURATION: 98 % | DIASTOLIC BLOOD PRESSURE: 107 MMHG

## 2019-09-06 PROBLEM — K57.92 DIVERTICULITIS OF INTESTINE, PART UNSPECIFIED, WITHOUT PERFORATION OR ABSCESS WITHOUT BLEEDING: Chronic | Status: ACTIVE | Noted: 2019-04-22

## 2019-09-06 PROBLEM — I10 ESSENTIAL (PRIMARY) HYPERTENSION: Chronic | Status: ACTIVE | Noted: 2019-04-22

## 2019-09-06 RX ORDER — DICLOFENAC SODIUM 30 MG/G
1 GEL TOPICAL
Qty: 30 | Refills: 0
Start: 2019-09-06

## 2020-04-25 ENCOUNTER — MESSAGE (OUTPATIENT)
Age: 60
End: 2020-04-25

## 2020-05-12 LAB
SARS-COV-2 IGG SERPL IA-ACNC: <0.1 INDEX
SARS-COV-2 IGG SERPL QL IA: NEGATIVE

## 2020-06-01 ENCOUNTER — EMERGENCY (EMERGENCY)
Facility: HOSPITAL | Age: 60
LOS: 0 days | Discharge: HOME | End: 2020-06-01
Attending: EMERGENCY MEDICINE
Payer: COMMERCIAL

## 2020-06-01 VITALS
HEART RATE: 75 BPM | DIASTOLIC BLOOD PRESSURE: 116 MMHG | SYSTOLIC BLOOD PRESSURE: 176 MMHG | RESPIRATION RATE: 20 BRPM | OXYGEN SATURATION: 93 % | TEMPERATURE: 99 F

## 2020-06-01 VITALS — WEIGHT: 229.94 LBS

## 2020-06-01 DIAGNOSIS — Z87.19 PERSONAL HISTORY OF OTHER DISEASES OF THE DIGESTIVE SYSTEM: ICD-10-CM

## 2020-06-01 DIAGNOSIS — R04.0 EPISTAXIS: ICD-10-CM

## 2020-06-01 DIAGNOSIS — Z98.890 OTHER SPECIFIED POSTPROCEDURAL STATES: Chronic | ICD-10-CM

## 2020-06-01 DIAGNOSIS — I10 ESSENTIAL (PRIMARY) HYPERTENSION: ICD-10-CM

## 2020-06-01 PROCEDURE — 99282 EMERGENCY DEPT VISIT SF MDM: CPT

## 2020-06-01 PROCEDURE — 30901 CONTROL OF NOSEBLEED: CPT

## 2020-06-01 PROCEDURE — 30901 CONTROL OF NOSEBLEED: CPT | Mod: RT

## 2020-06-01 PROCEDURE — 99282 EMERGENCY DEPT VISIT SF MDM: CPT | Mod: 25

## 2020-06-01 NOTE — ED STATDOCS - ENMT, MLM
pt initially packing R nares, upon removal had no bleeding, no blood to posterior oral pharynx.  Throat has no vesicles, no oropharyngeal exudates and uvula is midline.

## 2020-06-01 NOTE — ED STATDOCS - PATIENT PORTAL LINK FT
You can access the FollowMyHealth Patient Portal offered by [facility] by registering at the following website: [email or web address]. By joining [employer]’s FollowMyHealth portal, you will also be able to view your health information using other applications (apps) compatible with our system.

## 2020-06-01 NOTE — ED STATDOCS - CARE PROVIDER_API CALL
Perry Rivas  OTOLARYNGOLOGY  [address] [place] [address]  Phone: [phone]  Fax: [phone]  Follow Up Time:

## 2020-06-01 NOTE — ED ADULT TRIAGE NOTE - CHIEF COMPLAINT QUOTE
Pt complains of spontaneous nosebleed on Friday and again this morning.  No anticoagulants.  Pt denies fever, cough, sick contacts.

## 2020-06-01 NOTE — ED STATDOCS - OBJECTIVE STATEMENT
61 y/o male hx of HTN presents to the ED c/o epistaxis this morning. States his "whole outfit was bloody" and couldn't get the bleeding to stop, packed it with gauze. No current active bleeding, came to ED to cauterize, had similar episode 3 days ago. No ASA use. Recently discontinued use of Motrin. No anticoagulants. No trauma.

## 2020-06-01 NOTE — ED STATDOCS - CLINICAL SUMMARY MEDICAL DECISION MAKING FREE TEXT BOX
plan; cauterize with silver nitrate, pt sneezed multiple times. With no active bleeding, well appearing, will f/u with ENT appointment plan; cauterize with silver nitrate, pt sneezed multiple times with no active bleeding, well appearing, will f/u with ENT appointment

## 2020-06-01 NOTE — ED STATDOCS - CHPI ED CONTEXT
had similar episode 3 days ago, recently discontinued use of Motrin, no current use of anticoagulants

## 2020-06-02 ENCOUNTER — EMERGENCY (EMERGENCY)
Facility: HOSPITAL | Age: 60
LOS: 0 days | Discharge: HOME | End: 2020-06-02
Attending: EMERGENCY MEDICINE
Payer: COMMERCIAL

## 2020-06-02 VITALS
TEMPERATURE: 100 F | RESPIRATION RATE: 18 BRPM | SYSTOLIC BLOOD PRESSURE: 137 MMHG | HEART RATE: 68 BPM | OXYGEN SATURATION: 96 % | DIASTOLIC BLOOD PRESSURE: 94 MMHG

## 2020-06-02 VITALS — WEIGHT: 229.94 LBS

## 2020-06-02 DIAGNOSIS — Z98.890 OTHER SPECIFIED POSTPROCEDURAL STATES: Chronic | ICD-10-CM

## 2020-06-02 DIAGNOSIS — J45.909 UNSPECIFIED ASTHMA, UNCOMPLICATED: ICD-10-CM

## 2020-06-02 DIAGNOSIS — R04.0 EPISTAXIS: ICD-10-CM

## 2020-06-02 DIAGNOSIS — I10 ESSENTIAL (PRIMARY) HYPERTENSION: ICD-10-CM

## 2020-06-02 PROCEDURE — 99282 EMERGENCY DEPT VISIT SF MDM: CPT

## 2020-06-02 RX ORDER — OXYMETAZOLINE HYDROCHLORIDE 0.5 MG/ML
2 SPRAY NASAL ONCE
Refills: 0 | Status: COMPLETED | OUTPATIENT
Start: 2020-06-02 | End: 2020-06-02

## 2020-06-02 RX ADMIN — OXYMETAZOLINE HYDROCHLORIDE 2 SPRAY(S): 0.5 SPRAY NASAL at 17:31

## 2020-06-02 NOTE — ED STATDOCS - NSFOLLOWUPINSTRUCTIONS_ED_ALL_ED_FT
[facility]icRexnianCanafaviolaan [facility] ChineseVietnamese    Nosebleed, Adult  A nosebleed is when blood comes out of the nose. Nosebleeds are common. Usually, they are not a sign of a serious condition.  Nosebleeds can happen if a small blood vessel in your nose starts to bleed or if the lining of your nose (mucous membrane) cracks. They are commonly caused by:  Allergies.Colds.Picking your nose.Blowing your nose too hard.An injury from sticking an object into your nose or getting hit in the nose.Dry or cold air.Less common causes of nosebleeds include:  Toxic fumes.Something abnormal in the nose or in the air-filled spaces in the bones of the face (sinuses).Growths in the nose, such as polyps.Medicines or conditions that cause blood to clot slowly.Certain illnesses or procedures that irritate or dry out the nasal passages.Follow these instructions at home:  When you have a nosebleed:        Sit down and tilt your head slightly forward.Use a clean towel or tissue to pinch your nostrils under the bony part of your nose. After 10 minutes, let go of your nose and see if bleeding starts again. Do not release pressure before that time. If there is still bleeding, repeat the pinching and holding for 10 minutes until the bleeding stops.Do not place tissues or gauze in the nose to stop bleeding.Avoid lying down and avoid tilting your head backward. That may make blood collect in the throat and cause gagging or coughing.Use a nasal spray decongestant to help with a nosebleed as told by your health care provider.Do not use petroleum jelly or mineral oil in your nose. It can drip into your lungs.After a nosebleed:     Avoid blowing your nose or sniffing for a number of hours.Avoid straining, lifting, or bending at the waist for several days. You may resume other normal activities as you are able.Use saline spray or a humidifier as told by your health care provider.Aspirin and blood thinners make bleeding more likely. If you are prescribed these medicines and you suffer from nosebleeds:  Ask your health care provider if you should stop taking the medicines or if you should adjust the dose.Do not stop taking medicines that your health care provider has recommended unless told by your health care provider.If your nosebleed was caused by dry mucous membranes, use over-the-counter saline nasal spray or gel. This will keep the mucous membranes moist and allow them to heal. If you must use a lubricant:  Choose one that is water-soluble.Use only as much as you need and use it only as often as needed.Do not lie down until several hours after you use it.Contact a health care provider if:  You have a fever.You get nosebleeds often or more often than usual.You bruise very easily.You have a nosebleed from having something stuck in your nose.You have bleeding in your mouth.You vomit or cough up brown material.You have a nosebleed after you start a new medicine.Get help right away if:  You have a nosebleed after a fall or a head injury.Your nosebleed does not go away after 20 minutes.You feel dizzy or weak.You have unusual bleeding from other parts of your body.You have unusual bruising on other parts of your body.You become sweaty.You vomit blood.This information is not intended to replace advice given to you by your health care provider. Make sure you discuss any questions you have with your health care provider.    Rest. DO NOT bend over. DO NOT BLOW NOSE. Gently pat nose. Humidifier at bedside. If nose begins to bleed use pressure with index finger and thumb for 20 min. if still bleeding repeat and again for another 20 min. If still continues to bleed return to ED.   Follow up with ENT and PMD.

## 2020-06-02 NOTE — ED STATDOCS - CARE PROVIDER_API CALL
Williams Arredondo  OTOLARYNGOLOGY  [address] [place] [id number]  Phone: [phone]  Fax: [phone]  Follow Up Time:

## 2020-06-02 NOTE — ED STATDOCS - OBJECTIVE STATEMENT
61 y/o m with PMHx of HTN, diverticulitis presenting to the ED c/o epistaxis. Pt here yesterday for same, treated with silver nitrate cautery, returns to ED for recurrent epistaxis that occurred while at work. Pt self-treated with Afrin soaked paper towel and bleeding has since stopped. Pt requesting "more aggressive cautery". Denies fever, chills, any other acute c/o.

## 2020-06-02 NOTE — ED ADULT NURSE NOTE - OBJECTIVE STATEMENT
Patient presents with epistaxis that occurred while at work. Patient states he was here yesterday with same thing, was treated with silver nitrate cautery. States spontaneous bleed today. Reports he treated himself with soaked afrin guaze. Bleeding controlled

## 2020-06-02 NOTE — ED ADULT TRIAGE NOTE - CHIEF COMPLAINT QUOTE
Pt presents with nose bleed x 1 hour currently no longer bleeding.  PT states used Afrin at home.  PT seen in  ER yesterday for nose bleed and was cauterized.  Denies AC use.

## 2020-06-02 NOTE — ED STATDOCS - ATTENDING CONTRIBUTION TO CARE
I, Linh Cabello MD,  performed the initial face to face bedside interview with this patient regarding history of present illness, review of symptoms and relevant past medical, social and family history.  I completed an independent physical examination.  I was the initial provider who evaluated this patient. I have signed out the follow up of any pending tests (i.e. labs, radiological studies) to the ACP.  I have communicated the patient’s plan of care and disposition with the ACP.  The history, relevant review of systems, past medical and surgical history, medical decision making, and physical examination was documented by the scribe in my presence and I attest to the accuracy of the documentation.

## 2020-06-02 NOTE — ED STATDOCS - PROGRESS NOTE DETAILS
60 yr. old male PMH: Nose bleed, Diverticulitis,Asthma presents to ED with bleeding from right nostril at 3 pm. Not on any blood thinners. Last Friday had nose bleed and resolved. Again had nose bleed yesterday and seen in ED. Cauterization with silver nitrate and discharged. Seen and examined by attending in Intake. Plan: Afrin nasal spay to right nostril. Will F/U with patient. Kelsey VINSON Patient reports he will use Afrin when he gets home. No active bleeding. Kelsey VINSON

## 2020-06-02 NOTE — ED STATDOCS - PATIENT PORTAL LINK FT
You can access the FollowMyHealth Patient Portal offered by [facility] by registering at the following website: [email or web address]. By joining [email or web address]’s FollowMyHealth portal, you will also be able to view your health information using other applications (apps) compatible with our system.

## 2020-07-14 ENCOUNTER — APPOINTMENT (OUTPATIENT)
Dept: OTOLARYNGOLOGY | Facility: CLINIC | Age: 60
End: 2020-07-14
Payer: COMMERCIAL

## 2020-07-14 VITALS
TEMPERATURE: 98.4 F | BODY MASS INDEX: 31.14 KG/M2 | SYSTOLIC BLOOD PRESSURE: 143 MMHG | HEART RATE: 65 BPM | DIASTOLIC BLOOD PRESSURE: 92 MMHG | WEIGHT: 235 LBS | HEIGHT: 73 IN

## 2020-07-14 PROCEDURE — 99204 OFFICE O/P NEW MOD 45 MIN: CPT | Mod: 25

## 2020-07-14 PROCEDURE — 30903 CONTROL OF NOSEBLEED: CPT | Mod: LT

## 2020-07-14 NOTE — HISTORY OF PRESENT ILLNESS
[de-identified] : c/o problems with epistaxis for past 4-6 weeks.  Usually from right side.  Occ heavy.  Had attempt at cautery right side.  Cautery did help. Also has bp issue.  No asa or motrin.  No blood thinners.  No nosepicking.   Last episode was one hour ago.

## 2020-07-14 NOTE — ASSESSMENT
[FreeTextEntry1] : Patient with recurrent episodes of bleeding from right nostril.  Patient with vessel right anterior septum - cauterized with AgNO3 and surgicel applied.   Recommended no nose blowing for 5 days, sneeze with mouth open, reducted activity, no hot foods or EtOH and use of cool mist bedside humidifier.  After 5 days can start saline spray in nose 3x a day.  Also discussed use of afrin for active bleeding.\par Follow up in 4-6 weeks.

## 2020-07-14 NOTE — PHYSICAL EXAM
[Midline] : trachea located in midline position [Normal] : cranial nerves 2-12 intact [de-identified] : bleeding actively from right anterior septum vessel

## 2020-07-16 ENCOUNTER — APPOINTMENT (OUTPATIENT)
Dept: OTOLARYNGOLOGY | Facility: CLINIC | Age: 60
End: 2020-07-16

## 2020-09-11 ENCOUNTER — APPOINTMENT (OUTPATIENT)
Dept: OTOLARYNGOLOGY | Facility: CLINIC | Age: 60
End: 2020-09-11
Payer: COMMERCIAL

## 2020-09-11 VITALS — WEIGHT: 235 LBS | HEIGHT: 73 IN | TEMPERATURE: 98.3 F | BODY MASS INDEX: 31.14 KG/M2

## 2020-09-11 DIAGNOSIS — J30.9 ALLERGIC RHINITIS, UNSPECIFIED: ICD-10-CM

## 2020-09-11 DIAGNOSIS — R04.0 EPISTAXIS: ICD-10-CM

## 2020-09-11 PROCEDURE — 99213 OFFICE O/P EST LOW 20 MIN: CPT

## 2020-09-11 RX ORDER — LEVOCETIRIZINE DIHYDROCHLORIDE 5 MG/1
5 TABLET ORAL DAILY
Qty: 30 | Refills: 4 | Status: ACTIVE | COMMUNITY
Start: 2020-09-11 | End: 1900-01-01

## 2020-09-11 NOTE — HISTORY OF PRESENT ILLNESS
[de-identified] : fu re nose further bleeding no nasal obstruction\par co nasal drip seasonal allergies\par using claritin prn

## 2020-10-20 ENCOUNTER — APPOINTMENT (OUTPATIENT)
Dept: DERMATOLOGY | Facility: CLINIC | Age: 60
End: 2020-10-20
Payer: COMMERCIAL

## 2020-10-20 VITALS — HEIGHT: 73 IN | WEIGHT: 230 LBS | BODY MASS INDEX: 30.48 KG/M2

## 2020-10-20 DIAGNOSIS — B35.3 TINEA PEDIS: ICD-10-CM

## 2020-10-20 DIAGNOSIS — L98.8 OTHER SPECIFIED DISORDERS OF THE SKIN AND SUBCUTANEOUS TISSUE: ICD-10-CM

## 2020-10-20 DIAGNOSIS — L81.4 OTHER MELANIN HYPERPIGMENTATION: ICD-10-CM

## 2020-10-20 DIAGNOSIS — L72.3 SEBACEOUS CYST: ICD-10-CM

## 2020-10-20 DIAGNOSIS — L82.0 INFLAMED SEBORRHEIC KERATOSIS: ICD-10-CM

## 2020-10-20 PROCEDURE — 10060 I&D ABSCESS SIMPLE/SINGLE: CPT | Mod: 59

## 2020-10-20 PROCEDURE — 99203 OFFICE O/P NEW LOW 30 MIN: CPT | Mod: 25

## 2020-10-20 PROCEDURE — 17110 DESTRUCTION B9 LES UP TO 14: CPT | Mod: 59

## 2020-10-20 NOTE — HISTORY OF PRESENT ILLNESS
[de-identified] : Pt. presents for skin check;\par Severity:  mild  \par Modifying factors:  none\par Associated symptoms:  none\par Context:  no association with activity\par One persistent lesion on Right cheek; was treated at other office few years ago

## 2020-10-20 NOTE — PHYSICAL EXAM
[Full Body Skin Exam Performed] : performed [FreeTextEntry3] : Skin examination performed of the face, neck, trunk, arms, legs; \par The patient is well, alert and oriented, pleasant and cooperative.\par Eyelids, conjunctivae, oral mucosa, digits and nails all normal.  \par No cervical adenopathy.\par \par Normal findings include:\par \par Seborrheic keratoses- extensive on back- one traumatized lesion R cheek\par inflamed milium R upper lip\par Angiomas\par + lentigines and solar damage are present in sun exposed areas; \par \par No lesions were suspicious for malignancy. \par \par

## 2020-10-20 NOTE — ASSESSMENT
[FreeTextEntry1] : Complete skin examination is negative for malignancy;\par The patient has a history of significant sun exposure.  Continue annual exams. \par \par I&D to milium, LN2 to ISk\par \par also:  Renew Ecoza foam; for tinea of feet;  uses coupon

## 2021-03-19 DIAGNOSIS — Z01.818 ENCOUNTER FOR OTHER PREPROCEDURAL EXAMINATION: ICD-10-CM

## 2021-03-20 ENCOUNTER — APPOINTMENT (OUTPATIENT)
Dept: DISASTER EMERGENCY | Facility: CLINIC | Age: 61
End: 2021-03-20

## 2021-03-21 LAB — SARS-COV-2 N GENE NPH QL NAA+PROBE: NOT DETECTED

## 2021-03-23 ENCOUNTER — RESULT REVIEW (OUTPATIENT)
Age: 61
End: 2021-03-23

## 2021-04-14 ENCOUNTER — APPOINTMENT (OUTPATIENT)
Dept: DERMATOLOGY | Facility: CLINIC | Age: 61
End: 2021-04-14

## 2021-08-16 NOTE — BRIEF OPERATIVE NOTE - NSEVIDNCEINFORABSCESSFT_GEN_ALL_CORE
[FreeTextEntry1] : 89-year-old female presents for evaluation of left wrist pain. She denies specific trauma or inciting event. She complains of point tenderness at the distal radius that is worse with activity and improved with rest.\par \par 07/13/2021: Patient presents for reevaluation of her left wrist pain. She reports that she has had a worsening of the pain over the last month or so without any significant injury or inciting event. She was seen by Dr. Bell for evaluation and discussion for possible treatment of her nonunion distal radius fracture and was advised at that time to continue observation due to no significant pain.  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 She continues to have mild to moderate activity related pain at the fracture siteand would like to discuss further treatment options. At site of diverticular disease adherent to anterior abdominal wall

## 2021-08-23 ENCOUNTER — RX RENEWAL (OUTPATIENT)
Age: 61
End: 2021-08-23

## 2021-09-24 NOTE — ED STATDOCS - MDM PATIENT STATEMENT FOR ADDL TREATMENT
LM for pt to call us back   Patient with one or more new problems requiring additional work-up/treatment.

## 2022-02-03 ENCOUNTER — NON-APPOINTMENT (OUTPATIENT)
Age: 62
End: 2022-02-03

## 2022-02-04 ENCOUNTER — APPOINTMENT (OUTPATIENT)
Dept: ORTHOPEDIC SURGERY | Facility: CLINIC | Age: 62
End: 2022-02-04
Payer: COMMERCIAL

## 2022-02-04 ENCOUNTER — NON-APPOINTMENT (OUTPATIENT)
Age: 62
End: 2022-02-04

## 2022-02-04 PROCEDURE — 99204 OFFICE O/P NEW MOD 45 MIN: CPT | Mod: NC,25

## 2022-02-04 PROCEDURE — 73562 X-RAY EXAM OF KNEE 3: CPT | Mod: 26,RT

## 2022-02-04 PROCEDURE — 20610 DRAIN/INJ JOINT/BURSA W/O US: CPT | Mod: RT

## 2022-02-04 NOTE — DISCUSSION/SUMMARY
[de-identified] : Assessment: Right knee Osteoarthritis/Pain\par \par Plan:\par 1. RICE Therapy.\par 2. Antiinflammatories/Tylenol as needed for pain of discomfort. \par 3. The patient was advised to rest the knee for 24-48 hours post injection. The patient is able to resume normal activities in 24-48 hours post injection if the patient is experiencing minimal to no pain. \par 4. Continue with a home exercise/stretching program. \par 5. All the patients questions were answered. If the patient is experiencing any problems or has concerns they were advised to call the office or make an appointment to come in to be evaluated.\par \par A discussion was had about a series of viscosupplementation injections for the right knee. The patient would like to move forward with the injections. The injections were ordered for the patient in the office today.		\par \par

## 2022-02-04 NOTE — PROCEDURE
[de-identified] : Laterality: Right Knee\par \par The risks and benefits of the injection were reviewed/discussed with the patient today in office and all of their questions were answered.  The injection site was the anterolateral aspect of the knee with the patient sitting up and the knees flexed to 90 degrees.  Prior to the injection, the injection site was prepped with chloroprep and a sterile field was created.  Sterile technique was carried out throughout the procedure.  After verbal consent from the patient we went ahead and injected the right knee today with 2 ml 40 mg Depo-Medrol, 5 ml 1% lidocaine and 4 ml of .50% Bupivacaine for a total of 10 ml with a 22 donald 1.5" needle.  The medication was placed into the knee without complication.  Post injection the patient reported no pain, had a normal gait and good motion of the knee.  The patient denied numbness and tingling going down their leg.  There were no complications during the procedure.

## 2022-02-04 NOTE — ADDENDUM
[FreeTextEntry1] : I, Ana Boo, acted solely as a scribe for Dimas Lamar PA-C on this date 02/04/2022.\par \par All medical record entries made by the Scribe were at my, Dimas Lamar PA-C, direction and personally dictated by me on 02/04/2022  . I have reviewed the chart and agree that the record accurately reflects my personal performance of the history, physical exam, assessment and plan. I have also personally directed, reviewed, and agreed with the chart.	\par

## 2022-02-04 NOTE — HISTORY OF PRESENT ILLNESS
[de-identified] : The patient is a 61 year old male presenting for an initial evaluation of right knee pain, with worsening pain over the past week. He denies any acute trauma or injuries to his knee. He is 3 years s/p right knee arthroscopy. His main concern today is swelling and pain to the medial aspect of his knee. Pain scale 8 out of 10, with a constant timing. He has utilized anti-inflammatories with minimal to no relief noted. He denies any numbness or tingling sensations that radiate down his lower extremities. He is wearing sneakers and is walking without assistance. No other complaints.

## 2022-02-04 NOTE — PHYSICAL EXAM
[de-identified] : General: Alert and oriented x3. In no acute distress. Pleasant in nature with a normal affect. No apparent respiratory distress.\par Erythema, Warmth, Rubor: Negative\par Swelling/Edema: Negative\par \par ROM: R knee 0-110 degrees, with severe crepitus inside in the knee. \par Genny's Test: Negative\par Medial Joint Line TTP: Positive\par Lateral Joint Line TTP: Negative\par Lachman Exam/Anterior Drawer/Pivot Shift Test: Negative\par MCL Pain: Positive\par LCL Pain: Negative\par Iliotibial Band Pain: Negative\par Patellofemoral Joint Pain: Negative\par Pes Anserinus TTP: Negative\par Homans Sign: Negative\par Posterior Knee Pain: Negative\par Neuro: Intact with no sensory or motor deficits [de-identified] : 3V of the right knee were ordered, obtained, and reviewed by me today, 02/04/2022, revealed: Severe degenerative joint disease. \par

## 2022-02-11 ENCOUNTER — APPOINTMENT (OUTPATIENT)
Dept: ORTHOPEDIC SURGERY | Facility: CLINIC | Age: 62
End: 2022-02-11
Payer: COMMERCIAL

## 2022-02-11 VITALS
BODY MASS INDEX: 30.48 KG/M2 | DIASTOLIC BLOOD PRESSURE: 77 MMHG | WEIGHT: 230 LBS | HEART RATE: 81 BPM | HEIGHT: 73 IN | SYSTOLIC BLOOD PRESSURE: 126 MMHG

## 2022-02-11 DIAGNOSIS — M25.522 PAIN IN LEFT ELBOW: ICD-10-CM

## 2022-02-11 DIAGNOSIS — M19.029 PRIMARY OSTEOARTHRITIS, UNSPECIFIED ELBOW: ICD-10-CM

## 2022-02-11 DIAGNOSIS — G56.22 LESION OF ULNAR NERVE, LEFT UPPER LIMB: ICD-10-CM

## 2022-02-11 PROCEDURE — 99214 OFFICE O/P EST MOD 30 MIN: CPT | Mod: NC

## 2022-02-11 PROCEDURE — 73080 X-RAY EXAM OF ELBOW: CPT | Mod: 26,LT

## 2022-02-11 NOTE — ASSESSMENT
[FreeTextEntry1] : 61-year-old male with left elbow pain and stiffness, exam findings consistent with arthritis as well as irritation of the ulnar nerve/cubital tunnel syndrome.\par Patient has been informed of the findings and recommended for further testing with EMG/NCS to evaluate for ulnar nerve compression.  He has been recommended for home exercise to maintain mobility.  He has denied a physical therapy prescription today.  He will follow up with Dr. Carreon and for discussion for possible surgical intervention with cubital tunnel release/ulnar nerve transposition.

## 2022-02-11 NOTE — HISTORY OF PRESENT ILLNESS
[FreeTextEntry1] : 61 year male presents for evaluation of left elbow pain. He denies acute injury or inciting event. He reports his pain is sharp at times, with radiation down the arm into the small finger. He notes the pain is burning in nature, with associated persistent numbness and tingling of the small finger. He reports stiffness of the elbow as well. Of note, he reports a prior ulnar nerve transposition on the right arm 2/2 cubital tunnel syndrome. He denies recent EMG. He presents for options for treatment.

## 2022-02-11 NOTE — PHYSICAL EXAM
[de-identified] : Left elbow: \par Skin intact, no swelling, no ecchymosis, no gross deformity.\par Range of motion of the elbow reduced secondary to stiffness,\par ·Painful ROM: None\par ·Tenderness: Mild medial epicondyle pain. No lateral epicondyle pain. No olecranon pain. No pain at radial head.\par ·Strength: 5/5 elbow flexion, 5/5 elbow extension, 5/5 supination, 5/5 pronation\par ·Stability: Stable to varus/valgus stress\par ·Vasc: 2+ radial pulse, <2s cap refill\par ·Sensation: In tact to light touch throughout\par ·Neuro: Positive tinels at ulnar canal, AIN/PIN/Ulnar nerve in tact to motor.  Slight diminished sensation along the ulnar nerve distribution of the left hand.  Sensation intact to light touch\par  [de-identified] : 3 x-ray views of the left elbow taken today reveal no acute fracture, there is evidence of left elbow osteoarthritic changes.

## 2022-03-03 NOTE — H&P PST ADULT - ALCOHOL USE HISTORY SINGLE SELECT
-elevated potassium 5 2 likely secondary to Ace inhibitors versus Fidel   -given Kayexalate on the ED  -no acute changes on the electrocardiogram  -no plan for treatment at this point  -follow-up potassium in the a m  never

## 2022-03-08 ENCOUNTER — APPOINTMENT (OUTPATIENT)
Dept: ORTHOPEDIC SURGERY | Facility: CLINIC | Age: 62
End: 2022-03-08

## 2022-03-08 RX ORDER — DICLOFENAC SODIUM 1% 10 MG/G
1 GEL TOPICAL
Qty: 1 | Refills: 1 | Status: ACTIVE | COMMUNITY
Start: 2022-03-08 | End: 1900-01-01

## 2022-04-12 NOTE — DIETITIAN INITIAL EVALUATION ADULT. - NUTRITION DIAGNOSTIC TERMINOLOGY #1
Patient has bipolar disorder  He follows with [facility]  His moods are stable:  Denies depression      will continue Risperdal, hydroxyzine, Concerta Knowledge and Beliefs

## 2022-05-24 NOTE — ED ADULT TRIAGE NOTE - PAIN: PRESENCE, MLM
Please review. Protocol failed or has no protocol. Requested Prescriptions   Pending Prescriptions Disp Refills    gabapentin 100 MG Oral Cap 30 capsule 0     Sig: Take 1 capsule (100 mg total) by mouth nightly.         Neurology Medications Failed - 5/23/2022  7:32 PM        Failed - Appointment in the past 6 or next 3 months              Recent Outpatient Visits              6 months ago     [facility] GI PROCEDURE    Nurse Only    8 months ago Leukopenia, unspecified type    [facility] Hematology Oncology Aleah Sanon MD    Office Visit    8 months ago Routine physical examination    Minda Del Valle MD    Office Visit    1 year ago Restless legs syndrome    Minda Del Valle MD    [facility] E/M    2 years ago Routine physical examination    Minda Del Valle MD    Office Visit            Future Appointments         Provider Department Appt Notes    In 1 month STATHOPOULOS, PROCEDURE [facility] GI PROCEDURE Colon w/ MAC @ [address] [place] [address] denies pain/discomfort

## 2022-05-31 ENCOUNTER — RX RENEWAL (OUTPATIENT)
Age: 62
End: 2022-05-31

## 2023-04-14 ENCOUNTER — APPOINTMENT (OUTPATIENT)
Dept: ORTHOPEDIC SURGERY | Facility: CLINIC | Age: 63
End: 2023-04-14

## 2023-05-11 RX ORDER — ECONAZOLE NITRATE 10 MG/G
1 AEROSOL, FOAM TOPICAL
Qty: 70 | Refills: 8 | Status: ACTIVE | COMMUNITY
Start: 2020-10-20 | End: 1900-01-01

## 2023-08-24 ENCOUNTER — APPOINTMENT (OUTPATIENT)
Dept: OPHTHALMOLOGY | Facility: CLINIC | Age: 63
End: 2023-08-24
Payer: COMMERCIAL

## 2023-08-24 ENCOUNTER — NON-APPOINTMENT (OUTPATIENT)
Age: 63
End: 2023-08-24

## 2023-08-24 PROCEDURE — 92025 CPTRIZED CORNEAL TOPOGRAPHY: CPT

## 2023-08-24 PROCEDURE — 92004 COMPRE OPH EXAM NEW PT 1/>: CPT

## 2023-08-24 PROCEDURE — 92201 OPSCPY EXTND RTA DRAW UNI/BI: CPT

## 2023-09-08 ENCOUNTER — APPOINTMENT (OUTPATIENT)
Dept: ORTHOPEDIC SURGERY | Facility: CLINIC | Age: 63
End: 2023-09-08
Payer: COMMERCIAL

## 2023-09-08 ENCOUNTER — NON-APPOINTMENT (OUTPATIENT)
Age: 63
End: 2023-09-08

## 2023-09-08 DIAGNOSIS — M17.11 UNILATERAL PRIMARY OSTEOARTHRITIS, RIGHT KNEE: ICD-10-CM

## 2023-09-08 PROCEDURE — 73564 X-RAY EXAM KNEE 4 OR MORE: CPT | Mod: RT

## 2023-09-08 PROCEDURE — 20610 DRAIN/INJ JOINT/BURSA W/O US: CPT | Mod: RT

## 2023-09-08 PROCEDURE — 99214 OFFICE O/P EST MOD 30 MIN: CPT | Mod: 25

## 2023-09-08 NOTE — PHYSICAL EXAM
[de-identified] : General Appearance: normal without acute distress Mental: Alert and oriented x 3 Psych/affect: appropriate, cooperative Gait: Mild antalgic gait  Right lower extremity Hip: Normal ROM without pain on IR/ER Knee Inspection: no effusion Wounds: None Alignment: Varus Palpation: Mildly tender to palpation at medial joint line ROM active (in degrees): 5-100 with crepitus through the arc of motion Ligamentous laxity: all ligaments appear stable, stable to varus stress test, stable to valgus stress test Meniscal Test: Negative McMurrays, negative Kobi. Muscle Test: good quad strength. [de-identified] : 9/8/23: Right knee xrays, standing AP/Lateral and Merchant films, and 45 degree PA standing view are reviewed and demonstrate diffuse tricompartmental degenerative arthritis, medial joint space narrowing, marginal osteophytes, bone on bone with sclerosis, patellofemoral joint space narrowing, varus deformity

## 2023-09-08 NOTE — DISCUSSION/SUMMARY
[de-identified] : Right knee severe osteoarthritis  Extensive discussion of the natural history of this disease was had with the patient.  We discussed the treatment options ranging from conservative therapy which includes anti-inflammatories, steroid injections, physical therapy, weight loss, and activity modification.  We did discuss that the ultimate treatment for arthritis is a joint replacement.  At this time we will continue conservative treatment.   -Patient elected for steroid injection today. Patient will follow-up as needed if the pain returns or does not improve.  The patient's current medication management of their orthopedic diagnosis was reviewed today: (1) We discussed a comprehensive treatment plan that included possible pharmaceutical management involving the use of prescription strength medications including but not limited to options such as oral Ibuprofen 400mg QID, once daily Meloxicam 15 mg, or 500-650 mg Tylenol versus over the counter oral medications and topical prescription NSAID Pennsaid vs over the counter Voltaren gel. (2) There is a moderate risk of morbidity with further treatment, especially from use of prescription strength medications and possible side effects of these medications which include upset stomach with oral medications, skin reactions to topical medications and cardiac/renal issues with long term use. (3) I recommended that the patient follow-up with their medical physician to discuss any significant specific potential issues with long term medication use such as interactions with current medications or with exacerbation of underlying medical comorbidities. (4) The benefits and risks associated with use of injectable, oral or topical, prescription and over the counter anti-inflammatory medications were discussed with the patient. The patient voiced understanding of the risks including but not limited to bleeding, stroke, kidney dysfunction, heart disease, and were referred to the black box warning label for further information.

## 2023-09-08 NOTE — HISTORY OF PRESENT ILLNESS
[de-identified] : 9/8/23: Patient presents with years of right knee pain.  States he has been getting steroid injections occasionally last was last year.  Works at least for couple months.  Pain is diffuse in the knee.  Denies rating pain numbness tingling.  Takes Advil occasionally for the pain.  States he did have a knee scope by Dr. Wilson for 5 years ago.  Denies allergies anticoagulation tobacco use or past medical history.  Patient is a pharmacist.

## 2023-09-08 NOTE — PROCEDURE
[de-identified] : 9/8/23: Right knee injection - Steroid The risks, benefits, and alternatives of the injection were reviewed/discussed with the patient today in office and all of their questions were answered. We discussed possible side effects and efficacy of this injection.  The patient consented to the procedure.  Site and side were verified with the patient.  The inferolateral injection site was prepped with chloroprep.  Cold spray was utilized to numb the skin. Utilizing sterile technique, the knee was injected with 1 ml 40 mg methylprednisolone, 4 ml 1% Lidocaine, 5 mL 0.25% Bupivicaine. A sterile bandage was applied. The patient tolerated the procedure well and there were no complications.

## 2023-10-04 NOTE — ED PROCEDURE NOTE - NS ED PROCEDURE ASSISTED BY
Pt handoff report given to GURDEEP Li prior to departure.   One Karla [facility] Assistance was available

## 2023-10-27 NOTE — PATIENT PROFILE ADULT - DO YOU FEEL UNSAFE AT HOME, WORK, OR SCHOOL?
Addendum  created 05/31/18 1220 by Dolly Mackey RN    Sign clinical note, Visit Navigator SmartForm Flowsheet section accepted      
Start on Lexapro. Possible side effects can include jitteriness, increased sweating, anxiety, decreased libido, and delayed or absent orgasm. Call 911 if you experience any thoughts of self harm. People experience the best response when medication is paired with therapy/CBT. The course of this medication is for an initial trial of 4-6 months, at which time we will discuss whether or not to continue the medication.  Benefits are often seen at 2-4 weeks after reaching therapeutic dose. There can be an increase in suicidal think and you should stop the medication and call the clinic immediately if this occurs.     Other options include: [facility] (mainly [facility]) [phone] and [facility] [phone]  [address]      
no

## 2023-11-28 NOTE — ED ADULT NURSE NOTE - PRIMARY CARE PROVIDER
November 28, 2023     Patient: Li Teague   YOB: 1970   Date of Visit: 11/28/2023       To Whom it May Concern:    Li Teague was seen in my clinic on 11/28/2023. She  may return to work in 3 days.            Sincerely,          ALEJO Correa        CC: No Recipients    
Michael

## 2023-12-17 ENCOUNTER — EMERGENCY (EMERGENCY)
Facility: HOSPITAL | Age: 63
LOS: 0 days | Discharge: HOME | End: 2023-12-17
Attending: EMERGENCY MEDICINE
Payer: COMMERCIAL

## 2023-12-17 VITALS
OXYGEN SATURATION: 97 % | TEMPERATURE: 99 F | SYSTOLIC BLOOD PRESSURE: 141 MMHG | HEART RATE: 84 BPM | DIASTOLIC BLOOD PRESSURE: 96 MMHG | RESPIRATION RATE: 18 BRPM

## 2023-12-17 VITALS — HEIGHT: 73 IN | WEIGHT: 240.08 LBS

## 2023-12-17 DIAGNOSIS — M25.562 PAIN IN LEFT KNEE: ICD-10-CM
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
## 2023-12-17 DIAGNOSIS — Z98.890 OTHER SPECIFIED POSTPROCEDURAL STATES: Chronic | ICD-10-CM

## 2023-12-17 DIAGNOSIS — M19.90 UNSPECIFIED OSTEOARTHRITIS, UNSPECIFIED SITE: ICD-10-CM

## 2023-12-17 DIAGNOSIS — M25.512 PAIN IN LEFT SHOULDER: ICD-10-CM

## 2023-12-17 PROCEDURE — 99284 EMERGENCY DEPT VISIT MOD MDM: CPT

## 2023-12-17 PROCEDURE — 73562 X-RAY EXAM OF KNEE 3: CPT | Mod: LT

## 2023-12-17 PROCEDURE — 99284 EMERGENCY DEPT VISIT MOD MDM: CPT | Mod: 25

## 2023-12-17 PROCEDURE — 73562 X-RAY EXAM OF KNEE 3: CPT | Mod: 26,LT

## 2023-12-17 PROCEDURE — 73030 X-RAY EXAM OF SHOULDER: CPT | Mod: 26,LT

## 2023-12-17 PROCEDURE — 73030 X-RAY EXAM OF SHOULDER: CPT | Mod: LT

## 2023-12-17 RX ORDER — ACETAMINOPHEN 500 MG
1000 TABLET ORAL ONCE
Refills: 0 | Status: COMPLETED | OUTPATIENT
Start: 2023-12-17 | End: 2023-12-17

## 2023-12-17 RX ORDER — IBUPROFEN 200 MG
600 TABLET ORAL ONCE
Refills: 0 | Status: COMPLETED | OUTPATIENT
Start: 2023-12-17 | End: 2023-12-17

## 2023-12-17 NOTE — ED STATDOCS - CLINICAL SUMMARY MEDICAL DECISION MAKING FREE TEXT BOX
In summary this is a 61-year-old male who presents with a chief complaint of left knee pain and left shoulder pain.  No signs of septic joint, no wounds, no cellulitis, or any other findings on exam.  X-rays are negative for any acute bony fracture or dislocation.  Question knee sprain and acute on chronic pain of the left shoulder possibly secondary to rotator cuff tear, or labral injury.  Motrin and Tylenol given in the ED.  Recommend supportive care, RICE therapy, follow-up with orthopedics closely, patient states that he has an orthopedic doctor that he will follow-up with.  Strict return precautions given for any worsening.  Patient verbalizes understanding and agrees to plan at this time

## 2023-12-17 NOTE — ED STATDOCS - MUSCULOSKELETAL, MLM
mild left shoulder, no deformity, no swelling normal ROM. Mild b/l anterior knee TTP, no deformity, normal ROM, antalgic gait

## 2023-12-17 NOTE — ED STATDOCS - PATIENT PORTAL LINK FT
You can access the FollowMyHealth Patient Portal offered by [facility] by registering at the following website: [email or web address]. By joining [facility]’s FollowMyHealth portal, you will also be able to view your health information using other applications (apps) compatible with our system. You can access the FollowMyHealth Patient Portal offered by [facility] by registering at the following website: [email or web address]. By joining [facility]’s FollowMyHealth portal, you will also be able to view your health information using other applications (apps) compatible with our system.

## 2023-12-17 NOTE — ED STATDOCS - OBJECTIVE STATEMENT
64 yo male wPMHx of arthritis presents to the ED c/o left knee pain since Friday s/p twisting his leg while walking. Last Motrin at 12 pm. Pt also states he has a prior shoulder injury that is exacerbated. Pt was on abx for a noted laceration to left lower leg a few months ago. NKDA. 62 yo male wPMHx of arthritis presents to the ED c/o left knee pain since Friday s/p twisting his leg while walking. Last Motrin at 12 pm. Pt also states he has a prior shoulder injury that is exacerbated. Pt was on abx for a noted laceration to left lower leg a few months ago. NKDA.

## 2023-12-17 NOTE — ED STATDOCS - NSFOLLOWUPINSTRUCTIONS_ED_ALL_ED_FT
Musculoskeletal Pain  Musculoskeletal pain refers to aches and pains in your bones, joints, muscles, and the tissues that surround them. This pain can occur in any part of the body. It can last for a short time (acute) or a long time (chronic).    A physical exam, lab tests, and imaging studies may be done to find the cause of your musculoskeletal pain.    Follow these instructions at home:  Lifestyle    Try to control or lower your stress levels. Stress increases muscle tension and can worsen musculoskeletal pain. It is important to recognize when you are anxious or stressed and learn ways to manage it. This may include:  Meditation or yoga.  Cognitive or behavioral therapy.  Acupuncture or massage therapy.  You may continue all activities unless the activities cause more pain. When the pain gets better, slowly resume your normal activities. Gradually increase the intensity and duration of your activities or exercise.  Managing pain, stiffness, and swelling        Treatment may include medicines for pain and inflammation that are taken by mouth or applied to the skin. Take over-the-counter and prescription medicines only as told by your health care provider.  When your pain is severe, bed rest may be helpful. Lie or sit in any position that is comfortable, but get out of bed and walk around at least every couple of hours.  If directed, apply heat to the affected area as often as told by your health care provider. Use the heat source that your health care provider recommends, such as a moist heat pack or a heating pad.  Place a towel between your skin and the heat source.  Leave the heat on for 20–30 minutes.  Remove the heat if your skin turns bright red. This is especially important if you are unable to feel pain, heat, or cold. You may have a greater risk of getting burned.  If directed, put ice on the painful area. To do this:  Put ice in a plastic bag.  Place a towel between your skin and the bag.  Leave the ice on for 20 minutes, 2–3 times a day.  Remove the ice if your skin turns bright red. This is very important. If you cannot feel pain, heat, or cold, you have a greater risk of damage to the area.  General instructions    Your health care provider may recommend that you see a physical therapist. This person can help you come up with a safe exercise program.  If told by your health care provider, do physical therapy exercises to improve movement and strength in the affected area.  Keep all follow-up visits. This is important. This includes any physical therapy visits.  Contact a health care provider if:  Your pain gets worse.  Medicines do not help ease your pain.  You cannot use the part of your body that hurts, such as your arm, leg, or neck.  You have trouble sleeping.  You have trouble doing your normal activities.  Get help right away if:  You have a new injury and your pain is worse or different.  You feel numb or you have tingling in the painful area.  Summary  Musculoskeletal pain refers to aches and pains in your bones, joints, muscles, and the tissues that surround them.  This pain can occur in any part of the body.  Your health care provider may recommend that you see a physical therapist. This person can help you come up with a safe exercise program. Do any exercises as told by your physical therapist.  Lower your stress level. Stress can worsen musculoskeletal pain. Ways to lower stress may include meditation, yoga, cognitive or behavioral therapy, acupuncture, and massage therapy.  This information is not intended to replace advice given to you by your health care provider. Make sure you discuss any questions you have with your health care provider.    Document Revised: 04/22/2021 Document Reviewed: 03/31/2021  [employer] Patient Education © 2023 [employer].  [employer] logo  Terms and Conditions  Privacy Policy  Editorial Policy  All content on this site: Copyright © 2023 [employer] its licensors, and contributors. All rights are reserved, including those for text and data mining, AI training, and similar technologies. For all open access content, the Creative Commons licensing terms apply.  Cookies are used by this site. To decline or learn more, visit our Cookies page. Musculoskeletal Pain  Musculoskeletal pain refers to aches and pains in your bones, joints, muscles, and the tissues that surround them. This pain can occur in any part of the body. It can last for a short time (acute) or a long time (chronic).    A physical exam, lab tests, and imaging studies may be done to find the cause of your musculoskeletal pain.    Follow these instructions at home:  Lifestyle    Try to control or lower your stress levels. Stress increases muscle tension and can worsen musculoskeletal pain. It is important to recognize when you are anxious or stressed and learn ways to manage it. This may include:  Meditation or yoga.  Cognitive or behavioral therapy.  Acupuncture or massage therapy.  You may continue all activities unless the activities cause more pain. When the pain gets better, slowly resume your normal activities. Gradually increase the intensity and duration of your activities or exercise.  Managing pain, stiffness, and swelling        Treatment may include medicines for pain and inflammation that are taken by mouth or applied to the skin. Take over-the-counter and prescription medicines only as told by your health care provider.  When your pain is severe, bed rest may be helpful. Lie or sit in any position that is comfortable, but get out of bed and walk around at least every couple of hours.  If directed, apply heat to the affected area as often as told by your health care provider. Use the heat source that your health care provider recommends, such as a moist heat pack or a heating pad.  Place a towel between your skin and the heat source.  Leave the heat on for 20–30 minutes.  Remove the heat if your skin turns bright red. This is especially important if you are unable to feel pain, heat, or cold. You may have a greater risk of getting burned.  If directed, put ice on the painful area. To do this:  Put ice in a plastic bag.  Place a towel between your skin and the bag.  Leave the ice on for 20 minutes, 2–3 times a day.  Remove the ice if your skin turns bright red. This is very important. If you cannot feel pain, heat, or cold, you have a greater risk of damage to the area.  General instructions    Your health care provider may recommend that you see a physical therapist. This person can help you come up with a safe exercise program.  If told by your health care provider, do physical therapy exercises to improve movement and strength in the affected area.  Keep all follow-up visits. This is important. This includes any physical therapy visits.  Contact a health care provider if:  Your pain gets worse.  Medicines do not help ease your pain.  You cannot use the part of your body that hurts, such as your arm, leg, or neck.  You have trouble sleeping.  You have trouble doing your normal activities.  Get help right away if:  You have a new injury and your pain is worse or different.  You feel numb or you have tingling in the painful area.  Summary  Musculoskeletal pain refers to aches and pains in your bones, joints, muscles, and the tissues that surround them.  This pain can occur in any part of the body.  Your health care provider may recommend that you see a physical therapist. This person can help you come up with a safe exercise program. Do any exercises as told by your physical therapist.  Lower your stress level. Stress can worsen musculoskeletal pain. Ways to lower stress may include meditation, yoga, cognitive or behavioral therapy, acupuncture, and massage therapy.  This information is not intended to replace advice given to you by your health care provider. Make sure you discuss any questions you have with your health care provider.    Document Revised: 04/22/2021 Document Reviewed: 03/31/2021  [employer] Patient Education © 2023 [employer]  [employer] logo  Terms and Conditions  Privacy Policy  Editorial Policy  All content on this site: Copyright © 2023 [employer], its licensors, and contributors. All rights are reserved, including those for text and data mining, AI training, and similar technologies. For all open access content, the Creative Commons licensing terms apply.  Cookies are used by this site. To decline or learn more, visit our Cookies page.

## 2023-12-17 NOTE — ED STATDOCS - NSICDXPASTSURGICALHX_GEN_ALL_CORE_FT
PAST SURGICAL HISTORY:  H/O colonoscopy 04/2019    History of other surgery percutaneous drainage catheters 02/2019

## 2023-12-17 NOTE — ED ADULT NURSE NOTE - OBJECTIVE STATEMENT
States he twisted his leg while walking, complaining of left knee pain since Friday. Last took Motrin at 12pm. Patient also states he has a prior shoulder injury that is exacerbated s/p knee pain. Pain to left shoulder.

## 2024-01-02 ENCOUNTER — APPOINTMENT (OUTPATIENT)
Dept: ORTHOPEDIC SURGERY | Facility: CLINIC | Age: 64
End: 2024-01-02
Payer: COMMERCIAL

## 2024-01-02 VITALS — BODY MASS INDEX: 31.14 KG/M2 | HEIGHT: 73 IN | WEIGHT: 235 LBS

## 2024-01-02 DIAGNOSIS — M25.562 PAIN IN LEFT KNEE: ICD-10-CM

## 2024-01-02 DIAGNOSIS — M17.12 UNILATERAL PRIMARY OSTEOARTHRITIS, LEFT KNEE: ICD-10-CM

## 2024-01-02 PROCEDURE — 99214 OFFICE O/P EST MOD 30 MIN: CPT | Mod: 25

## 2024-01-02 PROCEDURE — 20610 DRAIN/INJ JOINT/BURSA W/O US: CPT | Mod: LT

## 2024-01-02 PROCEDURE — 73564 X-RAY EXAM KNEE 4 OR MORE: CPT | Mod: LT

## 2024-01-02 RX ORDER — MELOXICAM 15 MG/1
15 TABLET ORAL DAILY
Qty: 30 | Refills: 2 | Status: ACTIVE | COMMUNITY
Start: 2024-01-02 | End: 1900-01-01

## 2024-01-02 NOTE — PROCEDURE
[de-identified] : 1/2/24: Left knee injection - Steroid The risks, benefits, and alternatives of the injection were reviewed/discussed with the patient today in office and all of their questions were answered. We discussed possible side effects and efficacy of this injection.  The patient consented to the procedure.  Site and side were verified with the patient.  The inferolateral injection site was prepped with chloroprep.  Cold spray was utilized to numb the skin. Utilizing sterile technique, the knee was injected with 1 ml 40 mg methylprednisolone, 4 ml 1% Lidocaine, 5 mL 0.25% Bupivicaine. A sterile bandage was applied. The patient tolerated the procedure well and there were no complications.  9/8/23: Right knee injection - Steroid

## 2024-01-02 NOTE — PHYSICAL EXAM
[de-identified] : Left Knee Inspection: Mild effusion Wounds: None Alignment: Neutral Palpation: tender to palpation at medial joint line ROM active (in degrees): 0-130 with crepitus/pain through the arc of motion Ligamentous laxity: all ligaments appear stable, stable to varus stress test, stable to valgus stress test Meniscal Test: Mildly positive McMurrays, negative Kobi. Muscle Test: good quad strength.  Right lower extremity Hip: Normal ROM without pain on IR/ER Knee Inspection: no effusion Wounds: None Alignment: Varus Palpation: Mildly tender to palpation at medial joint line ROM active (in degrees): 5-100 with crepitus through the arc of motion Ligamentous laxity: all ligaments appear stable, stable to varus stress test, stable to valgus stress test Meniscal Test: Negative McMurrays, negative Kobi. Muscle Test: good quad strength. [de-identified] : 1/2/24: Left knee xrays, standing AP/Lateral and Merchant films, and 45 degree PA standing view are reviewed and demonstrate diffuse tricompartmental degenerative arthritis, medial joint space narrowing, marginal osteophytes, patellofemoral joint space narrowing  9/8/23: Right knee xrays, standing AP/Lateral and Merchant films, and 45 degree PA standing view are reviewed and demonstrate diffuse tricompartmental degenerative arthritis, medial joint space narrowing, marginal osteophytes, bone on bone with sclerosis, patellofemoral joint space narrowing, varus deformity

## 2024-01-02 NOTE — HISTORY OF PRESENT ILLNESS
[de-identified] : 1/2/24: Patient here for new complaint of left knee pain.  States over the last 1 to 2 weeks his knee swelled up.  Is been bothering him diffusely since.  Taking Motrin and Aleve occasionally for the pain.  States the steroid injections working well in the right knee.  9/8/23: Patient presents with years of right knee pain.  States he has been getting steroid injections occasionally last was last year.  Works at least for couple months.  Pain is diffuse in the knee.  Denies rating pain numbness tingling.  Takes Advil occasionally for the pain.  States he did have a knee scope by Dr. Wilson for 5 years ago.  Denies allergies anticoagulation tobacco use or past medical history.  Patient is a pharmacist.

## 2024-01-02 NOTE — DISCUSSION/SUMMARY
[de-identified] : Acute left knee exacerbation of arthritis, right worse than left knee osteoarthritis  Extensive discussion of the natural history of this disease was had with the patient.  We discussed the treatment options ranging from conservative therapy which includes anti-inflammatories, steroid injections, physical therapy, weight loss, and activity modification.  We did discuss that the ultimate treatment for arthritis is a joint replacement.  At this time we will continue conservative treatment.   -Patient elected for steroid injection today in the left knee. A prescription for meloxicam with the appropriate warnings for GI, cardiac, and renal side effects was given to the patient.  Patient was instructed not to use any other NSAIDs with this medication. Patient will follow-up as needed if the pain returns or does not improve.  The patient's current medication management of their orthopedic diagnosis was reviewed today: (1) We discussed a comprehensive treatment plan that included possible pharmaceutical management involving the use of prescription strength medications including but not limited to options such as oral Ibuprofen 400mg QID, once daily Meloxicam 15 mg, or 500-650 mg Tylenol versus over the counter oral medications and topical prescription NSAID Pennsaid vs over the counter Voltaren gel. (2) There is a moderate risk of morbidity with further treatment, especially from use of prescription strength medications and possible side effects of these medications which include upset stomach with oral medications, skin reactions to topical medications and cardiac/renal issues with long term use. (3) I recommended that the patient follow-up with their medical physician to discuss any significant specific potential issues with long term medication use such as interactions with current medications or with exacerbation of underlying medical comorbidities. (4) The benefits and risks associated with use of injectable, oral or topical, prescription and over the counter anti-inflammatory medications were discussed with the patient. The patient voiced understanding of the risks including but not limited to bleeding, stroke, kidney dysfunction, heart disease, and were referred to the black box warning label for further information.

## 2024-01-04 NOTE — ASU PREOP CHECKLIST - WAS PATIENT ON BETA BLOCKER?
Thankfully no sign of blood clot or pneumonia.  Your symptoms are due to muscle strain and spasm from coughing.  The inflammatory changes may take a week or 2 to improve.  Trying to control the cough will be the best way to control the pain.    I recommend 1000 mg of Tylenol 3 times a day.  You can also use warm tea or water with honey.  Try to keep your wound humidified at night.  Cough drops may also be helpful.    Reach out to your primary care doctor or OB team if symptoms are getting worse, not better with the above recommendations.   No

## 2024-03-07 ENCOUNTER — APPOINTMENT (OUTPATIENT)
Dept: ORTHOPEDIC SURGERY | Facility: CLINIC | Age: 64
End: 2024-03-07
Payer: COMMERCIAL

## 2024-03-07 VITALS
HEIGHT: 73 IN | WEIGHT: 235 LBS | DIASTOLIC BLOOD PRESSURE: 95 MMHG | SYSTOLIC BLOOD PRESSURE: 150 MMHG | BODY MASS INDEX: 31.14 KG/M2 | HEART RATE: 74 BPM

## 2024-03-07 DIAGNOSIS — M75.82 OTHER SHOULDER LESIONS, LEFT SHOULDER: ICD-10-CM

## 2024-03-07 PROCEDURE — 20611 DRAIN/INJ JOINT/BURSA W/US: CPT | Mod: LT

## 2024-03-07 PROCEDURE — 99214 OFFICE O/P EST MOD 30 MIN: CPT | Mod: 25

## 2024-03-07 PROCEDURE — 73030 X-RAY EXAM OF SHOULDER: CPT | Mod: LT

## 2024-03-07 NOTE — PHYSICAL EXAM
[de-identified] : General: Well appearing, no acute distress Neurologic: A&Ox3, No focal deficits Head: NCAT without abrasions, lacerations, or ecchymosis to head, face, or scalp Eyes: No scleral icterus, no gross abnormalities Respiratory: Equal chest wall expansion bilaterally, no accessory muscle use Lymphatic: No lymphadenopathy palpated Skin: Warm and dry Psychiatric: Normal mood and affect   Left Shoulder -Inspection/Palpation: no tenderness, swelling or deformities. -Range of Motion: full and painless in all planes, no crepitus; active FF 0-160; ER at side 45; IR to L1 -Strength: forward elevation in scapular plane [5]/5, internal rotation [5]/5, external rotation 4/5, adduction [5]/5 and abduction 4/5 -Stability: load and shift test negative, apprehension test negative, relocation test negative, sulcus sign negative, Liza test negative, Jerk test negative -Tests: Mcguire negative, Neer's test negative, drop arm test negative, bear hug test negative, [place] test negative, lift off sign negative, Hornblower's sign negative, no sulcus sign present, Speed's test negative, Yergason's test negative, Bettendorf's Active Compression test negative, Whipple test negative, Biceps load II test negative, crossed arm adduction negative Components of the arm are soft and nontender without evidence of DVT or compartment syndrome. The patient is grossly neurovascularly intact. [de-identified] : The following radiographs were ordered and read by me during this patient's visit. I reviewed each radiograph in detail with the patient and discussed the findings as highlighted below. 4 views of the left shoulder were obtained today that show no fracture, dislocation. There bone spurring noted. Arthritic changes.

## 2024-03-07 NOTE — PROCEDURE
[de-identified] : US guided Injection: Left shoulder (Subacromial). Indication: Pain A discussion was had with the patient regarding this procedure and all questions were answered. All risks, benefits and alternatives were discussed. These included but were not limited to bleeding, infection, and allergic reaction. Alcohol was used to clean the skin, and betadine was used to sterilize and prep the area in the posterior aspect of the left shoulder. Ethyl chloride spray was then used as a topical anesthetic. A 22-gauge needle was used to inject 3cc 1% xylocaine, 2cc 0.25% bupivacaine and 1cc of 40mg/mL triamcinolone acetonide into the right subacromial space. An ultrasound guidance was used for adequate placement of needle. A sterile bandage was then applied. The patient tolerated the procedure well and there were no complications.

## 2024-03-07 NOTE — DISCUSSION/SUMMARY
[de-identified] : We had a thorough discussion regarding the nature of his pain, the pathophysiology, as well as all treatment options. Pt due to his acute pain elected for a corticosteroid injection at today's visit and tolerated the procedure well. He should take it easy for the next 2-3 days while icing the joint. Patient was given prescription of formal physical therapy that he will perform 2x/wk for 6-8 wks. All questions were answered and the patient verbalized understanding. The patient is in agreement with this treatment plan.

## 2024-03-07 NOTE — HISTORY OF PRESENT ILLNESS
[de-identified] : SAV is a 63 year male here today for left shoulder pain for about a year. He reports he fell onto the shoulder in the past. He reports that he has mechanical symptoms ("snap, crackle, pop") in his shoulder. He has been taking Meloxicam for pain management without success.

## 2024-03-07 NOTE — CONSULT LETTER
[Dear  ___] : Dear  [unfilled], [Consult Letter:] : I had the pleasure of evaluating your patient, [unfilled]. [Please see my note below.] : Please see my note below. [Sincerely,] : Sincerely, [FreeTextEntry3] : Dr. Anish Sun

## 2024-03-07 NOTE — ADDENDUM
[FreeTextEntry1] : Documented by Karen Mcleod acting as a scribe for Dr. Sun on 03/07/2024. All medical record entries made by the Scribe were at my, Dr. Sun's, direction and personally dictated by me on 03/07/2024. I have reviewed the chart and agree that the record accurately reflects my personal performance of the history, physical exam, procedure and imaging.

## 2024-08-05 NOTE — H&P ADULT - NSHPLABSRESULTS_GEN_ALL_CORE
Bed: 02  Expected date:   Expected time:   Means of arrival:   Comments:  EMS   14.9   16.15 )-----------( 192      ( 26 Jan 2019 01:18 )             45.7       CBC Full  -  ( 26 Jan 2019 01:18 )  WBC Count : 16.15 K/uL  Hemoglobin : 14.9 g/dL  Hematocrit : 45.7 %  Platelet Count - Automated : 192 K/uL  Mean Cell Volume : 94.8 fl  Mean Cell Hemoglobin : 30.9 pg  Mean Cell Hemoglobin Concentration : 32.6 gm/dL  Auto Neutrophil # : 13.57 K/uL  Auto Lymphocyte # : 0.65 K/uL  Auto Monocyte # : 0.81 K/uL  Auto Eosinophil # : 0.00 K/uL  Auto Basophil # : 0.00 K/uL  Auto Neutrophil % : 59.0 %  Auto Lymphocyte % : 4.0 %  Auto Monocyte % : 5.0 %  Auto Eosinophil % : 0.0 %  Auto Basophil % : 0.0 %      01-26    143  |  106  |  28<H>  ----------------------------<  97  3.6   |  28  |  1.61<H>    Ca    9.0      26 Jan 2019 01:18  Mg     1.8     01-26    TPro  6.6  /  Alb  3.6  /  TBili  0.8  /  DBili  x   /  AST  17  /  ALT  30  /  AlkPhos  47  01-26      PT/INR - ( 26 Jan 2019 01:18 )   PT: 13.0 sec;   INR: 1.17 ratio         PTT - ( 26 Jan 2019 01:18 )  PTT:25.9 sec        LIVER FUNCTIONS - ( 26 Jan 2019 01:18 )  Alb: 3.6 g/dL / Pro: 6.6 gm/dL / ALK PHOS: 47 U/L / ALT: 30 U/L / AST: 17 U/L / GGT: x

## 2024-11-15 ENCOUNTER — APPOINTMENT (OUTPATIENT)
Dept: ORTHOPEDIC SURGERY | Facility: CLINIC | Age: 64
End: 2024-11-15
Payer: COMMERCIAL

## 2024-11-15 DIAGNOSIS — M17.11 UNILATERAL PRIMARY OSTEOARTHRITIS, RIGHT KNEE: ICD-10-CM

## 2024-11-15 DIAGNOSIS — M17.12 UNILATERAL PRIMARY OSTEOARTHRITIS, LEFT KNEE: ICD-10-CM

## 2024-11-15 PROCEDURE — 99214 OFFICE O/P EST MOD 30 MIN: CPT | Mod: 25

## 2024-11-15 PROCEDURE — 20610 DRAIN/INJ JOINT/BURSA W/O US: CPT | Mod: 50

## 2024-11-15 PROCEDURE — 73564 X-RAY EXAM KNEE 4 OR MORE: CPT | Mod: 50

## 2025-02-13 NOTE — ED ADULT TRIAGE NOTE - CHIEF COMPLAINT QUOTE
LVM for Pt to call back, referral faxed   States he twisted his leg while walking, complaining of left knee pain since Friday. Last took Motrin at 12pm. Patient also states he has a prior shoulder injury that is exacerbated s/p knee pain. Pain to left shoulder.

## 2025-02-24 ENCOUNTER — APPOINTMENT (OUTPATIENT)
Dept: ORTHOPEDIC SURGERY | Facility: CLINIC | Age: 65
End: 2025-02-24
Payer: COMMERCIAL

## 2025-02-24 DIAGNOSIS — M17.12 UNILATERAL PRIMARY OSTEOARTHRITIS, LEFT KNEE: ICD-10-CM

## 2025-02-24 DIAGNOSIS — M17.11 UNILATERAL PRIMARY OSTEOARTHRITIS, RIGHT KNEE: ICD-10-CM

## 2025-02-24 PROCEDURE — 20610 DRAIN/INJ JOINT/BURSA W/O US: CPT | Mod: 50

## 2025-02-24 PROCEDURE — 99214 OFFICE O/P EST MOD 30 MIN: CPT | Mod: 25

## 2025-03-02 NOTE — DISCHARGE NOTE ADULT - MEDICATION SUMMARY - MEDICATIONS TO TAKE
I will START or STAY ON the medications listed below when I get home from the hospital:    Lyrica 100 mg oral capsule  -- 1 cap(s) by mouth 3 times a day  -- Indication: For home med    Welchol 625 mg oral tablet  -- 3 tab(s) by mouth 2 times a day  -- Indication: For home med    Coreg 12.5 mg oral tablet  -- 1 tab(s) by mouth 2 times a day  -- Indication: For hypertension    Proventil HFA 90 mcg/inh inhalation aerosol  -- 2 puff(s) inhaled 4 times a day, As Needed  -- Indication: For home med    Breo Ellipta 200 mcg-25 mcg/inh inhalation powder  -- 1 puff(s) inhaled once a day  -- Indication: For home med    ertapenem 1 g injection  -- 1 dose(s) injectable once a day  -- Indication: For Diverticulitis of colon
Yes...

## 2025-04-02 ENCOUNTER — NON-APPOINTMENT (OUTPATIENT)
Age: 65
End: 2025-04-02

## 2025-04-07 ENCOUNTER — OUTPATIENT (OUTPATIENT)
Dept: OUTPATIENT SERVICES | Facility: HOSPITAL | Age: 65
LOS: 1 days | End: 2025-04-07
Payer: COMMERCIAL

## 2025-04-07 DIAGNOSIS — Z98.890 OTHER SPECIFIED POSTPROCEDURAL STATES: Chronic | ICD-10-CM

## 2025-04-07 DIAGNOSIS — L97.829 NON-PRESSURE CHRONIC ULCER OF OTHER PART OF LEFT LOWER LEG WITH UNSPECIFIED SEVERITY: ICD-10-CM

## 2025-04-07 PROCEDURE — 99203 OFFICE O/P NEW LOW 30 MIN: CPT | Mod: 25

## 2025-04-07 PROCEDURE — 11042 DBRDMT SUBQ TIS 1ST 20SQCM/<: CPT | Mod: 59

## 2025-04-09 DIAGNOSIS — R60.9 EDEMA, UNSPECIFIED: ICD-10-CM

## 2025-04-09 DIAGNOSIS — S81.802A UNSPECIFIED OPEN WOUND, LEFT LOWER LEG, INITIAL ENCOUNTER: ICD-10-CM

## 2025-04-09 DIAGNOSIS — E11.9 TYPE 2 DIABETES MELLITUS WITHOUT COMPLICATIONS: ICD-10-CM
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
## 2025-04-09 DIAGNOSIS — M79.662 PAIN IN LEFT LOWER LEG: ICD-10-CM
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
## 2025-04-14 ENCOUNTER — OUTPATIENT (OUTPATIENT)
Dept: OUTPATIENT SERVICES | Facility: HOSPITAL | Age: 65
LOS: 1 days | End: 2025-04-14
Payer: COMMERCIAL

## 2025-04-14 DIAGNOSIS — L97.829 NON-PRESSURE CHRONIC ULCER OF OTHER PART OF LEFT LOWER LEG WITH UNSPECIFIED SEVERITY: ICD-10-CM

## 2025-04-14 DIAGNOSIS — Z98.890 OTHER SPECIFIED POSTPROCEDURAL STATES: Chronic | ICD-10-CM

## 2025-04-14 PROCEDURE — 99203 OFFICE O/P NEW LOW 30 MIN: CPT | Mod: 25

## 2025-04-14 PROCEDURE — 11042 DBRDMT SUBQ TIS 1ST 20SQCM/<: CPT

## 2025-04-14 PROCEDURE — 11042 DBRDMT SUBQ TIS 1ST 20SQCM/<: CPT | Mod: 59
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
## 2025-04-15 DIAGNOSIS — R60.9 EDEMA, UNSPECIFIED: ICD-10-CM

## 2025-04-15 DIAGNOSIS — S81.802A UNSPECIFIED OPEN WOUND, LEFT LOWER LEG, INITIAL ENCOUNTER: ICD-10-CM

## 2025-04-15 DIAGNOSIS — E11.9 TYPE 2 DIABETES MELLITUS WITHOUT COMPLICATIONS: ICD-10-CM
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
## 2025-04-15 DIAGNOSIS — M79.662 PAIN IN LEFT LOWER LEG: ICD-10-CM

## 2025-04-21 ENCOUNTER — OUTPATIENT (OUTPATIENT)
Dept: OUTPATIENT SERVICES | Facility: HOSPITAL | Age: 65
LOS: 1 days | End: 2025-04-21
Payer: COMMERCIAL

## 2025-04-21 DIAGNOSIS — E11.9 TYPE 2 DIABETES MELLITUS WITHOUT COMPLICATIONS: ICD-10-CM

## 2025-04-21 DIAGNOSIS — Z98.890 OTHER SPECIFIED POSTPROCEDURAL STATES: Chronic | ICD-10-CM

## 2025-04-21 DIAGNOSIS — M79.662 PAIN IN LEFT LOWER LEG: ICD-10-CM

## 2025-04-21 PROCEDURE — 11042 DBRDMT SUBQ TIS 1ST 20SQCM/<: CPT

## 2025-04-28 ENCOUNTER — OUTPATIENT (OUTPATIENT)
Dept: OUTPATIENT SERVICES | Facility: HOSPITAL | Age: 65
LOS: 1 days | End: 2025-04-28
Payer: COMMERCIAL

## 2025-04-28 DIAGNOSIS — Z98.890 OTHER SPECIFIED POSTPROCEDURAL STATES: Chronic | ICD-10-CM

## 2025-04-28 DIAGNOSIS — M79.662 PAIN IN LEFT LOWER LEG: ICD-10-CM

## 2025-04-28 DIAGNOSIS — E11.9 TYPE 2 DIABETES MELLITUS WITHOUT COMPLICATIONS: ICD-10-CM

## 2025-04-28 PROCEDURE — 11042 DBRDMT SUBQ TIS 1ST 20SQCM/<: CPT

## 2025-04-30 DIAGNOSIS — L97.822 NON-PRESSURE CHRONIC ULCER OF OTHER PART OF LEFT LOWER LEG WITH FAT LAYER EXPOSED: ICD-10-CM

## 2025-04-30 DIAGNOSIS — E11.622 TYPE 2 DIABETES MELLITUS WITH OTHER SKIN ULCER: ICD-10-CM

## 2025-04-30 DIAGNOSIS — R60.9 EDEMA, UNSPECIFIED: ICD-10-CM

## 2025-04-30 DIAGNOSIS — M79.662 PAIN IN LEFT LOWER LEG: ICD-10-CM

## 2025-05-02 DIAGNOSIS — R60.9 EDEMA, UNSPECIFIED: ICD-10-CM

## 2025-05-02 DIAGNOSIS — L97.822 NON-PRESSURE CHRONIC ULCER OF OTHER PART OF LEFT LOWER LEG WITH FAT LAYER EXPOSED: ICD-10-CM

## 2025-05-02 DIAGNOSIS — E11.622 TYPE 2 DIABETES MELLITUS WITH OTHER SKIN ULCER: ICD-10-CM

## 2025-05-05 ENCOUNTER — OUTPATIENT (OUTPATIENT)
Dept: OUTPATIENT SERVICES | Facility: HOSPITAL | Age: 65
LOS: 1 days | End: 2025-05-05
Payer: COMMERCIAL

## 2025-05-05 DIAGNOSIS — Z98.890 OTHER SPECIFIED POSTPROCEDURAL STATES: Chronic | ICD-10-CM

## 2025-05-05 DIAGNOSIS — M79.662 PAIN IN LEFT LOWER LEG: ICD-10-CM

## 2025-05-05 DIAGNOSIS — E11.9 TYPE 2 DIABETES MELLITUS WITHOUT COMPLICATIONS: ICD-10-CM

## 2025-05-05 PROCEDURE — 11042 DBRDMT SUBQ TIS 1ST 20SQCM/<: CPT

## 2025-05-07 DIAGNOSIS — M79.662 PAIN IN LEFT LOWER LEG: ICD-10-CM

## 2025-05-07 DIAGNOSIS — R60.9 EDEMA, UNSPECIFIED: ICD-10-CM

## 2025-05-07 DIAGNOSIS — L97.822 NON-PRESSURE CHRONIC ULCER OF OTHER PART OF LEFT LOWER LEG WITH FAT LAYER EXPOSED: ICD-10-CM

## 2025-05-07 DIAGNOSIS — E11.622 TYPE 2 DIABETES MELLITUS WITH OTHER SKIN ULCER: ICD-10-CM

## 2025-05-12 ENCOUNTER — OUTPATIENT (OUTPATIENT)
Dept: OUTPATIENT SERVICES | Facility: HOSPITAL | Age: 65
LOS: 1 days | End: 2025-05-12
Payer: COMMERCIAL

## 2025-05-12 DIAGNOSIS — E11.9 TYPE 2 DIABETES MELLITUS WITHOUT COMPLICATIONS: ICD-10-CM

## 2025-05-12 DIAGNOSIS — Z98.890 OTHER SPECIFIED POSTPROCEDURAL STATES: Chronic | ICD-10-CM

## 2025-05-12 DIAGNOSIS — M79.662 PAIN IN LEFT LOWER LEG: ICD-10-CM

## 2025-05-12 PROCEDURE — 11042 DBRDMT SUBQ TIS 1ST 20SQCM/<: CPT

## 2025-05-16 DIAGNOSIS — R60.9 EDEMA, UNSPECIFIED: ICD-10-CM

## 2025-05-16 DIAGNOSIS — E11.622 TYPE 2 DIABETES MELLITUS WITH OTHER SKIN ULCER: ICD-10-CM

## 2025-05-16 DIAGNOSIS — M79.662 PAIN IN LEFT LOWER LEG: ICD-10-CM

## 2025-05-16 DIAGNOSIS — L97.822 NON-PRESSURE CHRONIC ULCER OF OTHER PART OF LEFT LOWER LEG WITH FAT LAYER EXPOSED: ICD-10-CM

## 2025-05-19 ENCOUNTER — OUTPATIENT (OUTPATIENT)
Dept: OUTPATIENT SERVICES | Facility: HOSPITAL | Age: 65
LOS: 1 days | End: 2025-05-19
Payer: COMMERCIAL

## 2025-05-19 DIAGNOSIS — M79.662 PAIN IN LEFT LOWER LEG: ICD-10-CM

## 2025-05-19 DIAGNOSIS — E11.9 TYPE 2 DIABETES MELLITUS WITHOUT COMPLICATIONS: ICD-10-CM

## 2025-05-19 DIAGNOSIS — Z98.890 OTHER SPECIFIED POSTPROCEDURAL STATES: Chronic | ICD-10-CM

## 2025-05-19 PROCEDURE — 11042 DBRDMT SUBQ TIS 1ST 20SQCM/<: CPT

## 2025-05-30 DIAGNOSIS — L97.822 NON-PRESSURE CHRONIC ULCER OF OTHER PART OF LEFT LOWER LEG WITH FAT LAYER EXPOSED: ICD-10-CM

## 2025-05-30 DIAGNOSIS — M79.662 PAIN IN LEFT LOWER LEG: ICD-10-CM

## 2025-05-30 DIAGNOSIS — E11.622 TYPE 2 DIABETES MELLITUS WITH OTHER SKIN ULCER: ICD-10-CM

## 2025-05-30 DIAGNOSIS — R60.9 EDEMA, UNSPECIFIED: ICD-10-CM

## 2025-06-02 ENCOUNTER — APPOINTMENT (OUTPATIENT)
Dept: ORTHOPEDIC SURGERY | Facility: CLINIC | Age: 65
End: 2025-06-02
Payer: COMMERCIAL

## 2025-06-02 VITALS — HEIGHT: 73 IN | WEIGHT: 235 LBS | BODY MASS INDEX: 31.14 KG/M2

## 2025-06-02 DIAGNOSIS — M17.12 UNILATERAL PRIMARY OSTEOARTHRITIS, LEFT KNEE: ICD-10-CM

## 2025-06-02 DIAGNOSIS — M17.11 UNILATERAL PRIMARY OSTEOARTHRITIS, RIGHT KNEE: ICD-10-CM

## 2025-06-02 PROCEDURE — 99215 OFFICE O/P EST HI 40 MIN: CPT | Mod: 25

## 2025-06-02 PROCEDURE — 20610 DRAIN/INJ JOINT/BURSA W/O US: CPT | Mod: 50

## 2025-06-02 PROCEDURE — 73564 X-RAY EXAM KNEE 4 OR MORE: CPT | Mod: 50

## 2025-06-09 ENCOUNTER — OUTPATIENT (OUTPATIENT)
Dept: OUTPATIENT SERVICES | Facility: HOSPITAL | Age: 65
LOS: 1 days | End: 2025-06-09
Payer: COMMERCIAL

## 2025-06-09 DIAGNOSIS — M79.662 PAIN IN LEFT LOWER LEG: ICD-10-CM

## 2025-06-09 DIAGNOSIS — Z98.890 OTHER SPECIFIED POSTPROCEDURAL STATES: Chronic | ICD-10-CM

## 2025-06-09 DIAGNOSIS — E11.9 TYPE 2 DIABETES MELLITUS WITHOUT COMPLICATIONS: ICD-10-CM

## 2025-06-09 PROCEDURE — 11042 DBRDMT SUBQ TIS 1ST 20SQCM/<: CPT

## 2025-06-11 DIAGNOSIS — R60.9 EDEMA, UNSPECIFIED: ICD-10-CM

## 2025-06-11 DIAGNOSIS — L97.822 NON-PRESSURE CHRONIC ULCER OF OTHER PART OF LEFT LOWER LEG WITH FAT LAYER EXPOSED: ICD-10-CM

## 2025-06-16 ENCOUNTER — OUTPATIENT (OUTPATIENT)
Dept: OUTPATIENT SERVICES | Facility: HOSPITAL | Age: 65
LOS: 1 days | End: 2025-06-16
Payer: COMMERCIAL

## 2025-06-16 DIAGNOSIS — Z98.890 OTHER SPECIFIED POSTPROCEDURAL STATES: Chronic | ICD-10-CM

## 2025-06-16 DIAGNOSIS — M79.662 PAIN IN LEFT LOWER LEG: ICD-10-CM

## 2025-06-16 PROCEDURE — 11042 DBRDMT SUBQ TIS 1ST 20SQCM/<: CPT

## 2025-06-23 ENCOUNTER — OUTPATIENT (OUTPATIENT)
Dept: OUTPATIENT SERVICES | Facility: HOSPITAL | Age: 65
LOS: 1 days | End: 2025-06-23
Payer: COMMERCIAL

## 2025-06-23 DIAGNOSIS — Z98.890 OTHER SPECIFIED POSTPROCEDURAL STATES: Chronic | ICD-10-CM

## 2025-06-23 DIAGNOSIS — M79.662 PAIN IN LEFT LOWER LEG: ICD-10-CM

## 2025-06-23 PROCEDURE — 11045 DBRDMT SUBQ TISS EACH ADDL: CPT

## 2025-06-23 PROCEDURE — 11042 DBRDMT SUBQ TIS 1ST 20SQCM/<: CPT

## 2025-06-25 DIAGNOSIS — L97.822 NON-PRESSURE CHRONIC ULCER OF OTHER PART OF LEFT LOWER LEG WITH FAT LAYER EXPOSED: ICD-10-CM

## 2025-06-25 DIAGNOSIS — R60.9 EDEMA, UNSPECIFIED: ICD-10-CM

## 2025-06-25 DIAGNOSIS — M79.662 PAIN IN LEFT LOWER LEG: ICD-10-CM

## 2025-06-27 DIAGNOSIS — R73.03 PREDIABETES: ICD-10-CM

## 2025-06-27 DIAGNOSIS — M79.662 PAIN IN LEFT LOWER LEG: ICD-10-CM

## 2025-06-27 DIAGNOSIS — R60.9 EDEMA, UNSPECIFIED: ICD-10-CM

## 2025-06-27 DIAGNOSIS — L97.822 NON-PRESSURE CHRONIC ULCER OF OTHER PART OF LEFT LOWER LEG WITH FAT LAYER EXPOSED: ICD-10-CM

## 2025-06-30 ENCOUNTER — OUTPATIENT (OUTPATIENT)
Dept: OUTPATIENT SERVICES | Facility: HOSPITAL | Age: 65
LOS: 1 days | End: 2025-06-30
Payer: COMMERCIAL

## 2025-06-30 DIAGNOSIS — M79.662 PAIN IN LEFT LOWER LEG: ICD-10-CM

## 2025-06-30 DIAGNOSIS — Z98.890 OTHER SPECIFIED POSTPROCEDURAL STATES: Chronic | ICD-10-CM

## 2025-06-30 PROCEDURE — 11042 DBRDMT SUBQ TIS 1ST 20SQCM/<: CPT

## 2025-07-07 ENCOUNTER — OUTPATIENT (OUTPATIENT)
Dept: OUTPATIENT SERVICES | Facility: HOSPITAL | Age: 65
LOS: 1 days | End: 2025-07-07
Payer: COMMERCIAL

## 2025-07-07 DIAGNOSIS — M79.662 PAIN IN LEFT LOWER LEG: ICD-10-CM

## 2025-07-07 DIAGNOSIS — Z98.890 OTHER SPECIFIED POSTPROCEDURAL STATES: Chronic | ICD-10-CM

## 2025-07-07 DIAGNOSIS — R60.9 EDEMA, UNSPECIFIED: ICD-10-CM

## 2025-07-07 DIAGNOSIS — E11.622 TYPE 2 DIABETES MELLITUS WITH OTHER SKIN ULCER: ICD-10-CM

## 2025-07-07 DIAGNOSIS — L97.822 NON-PRESSURE CHRONIC ULCER OF OTHER PART OF LEFT LOWER LEG WITH FAT LAYER EXPOSED: ICD-10-CM

## 2025-07-07 PROCEDURE — 11042 DBRDMT SUBQ TIS 1ST 20SQCM/<: CPT

## 2025-07-08 DIAGNOSIS — M79.662 PAIN IN LEFT LOWER LEG: ICD-10-CM

## 2025-07-08 DIAGNOSIS — E11.622 TYPE 2 DIABETES MELLITUS WITH OTHER SKIN ULCER: ICD-10-CM

## 2025-07-08 DIAGNOSIS — L97.822 NON-PRESSURE CHRONIC ULCER OF OTHER PART OF LEFT LOWER LEG WITH FAT LAYER EXPOSED: ICD-10-CM

## 2025-07-08 DIAGNOSIS — R60.9 EDEMA, UNSPECIFIED: ICD-10-CM

## 2025-07-14 ENCOUNTER — OUTPATIENT (OUTPATIENT)
Dept: OUTPATIENT SERVICES | Facility: HOSPITAL | Age: 65
LOS: 1 days | End: 2025-07-14
Payer: COMMERCIAL

## 2025-07-14 DIAGNOSIS — Z98.890 OTHER SPECIFIED POSTPROCEDURAL STATES: Chronic | ICD-10-CM

## 2025-07-14 DIAGNOSIS — M79.662 PAIN IN LEFT LOWER LEG: ICD-10-CM

## 2025-07-14 PROCEDURE — 99214 OFFICE O/P EST MOD 30 MIN: CPT | Mod: 25

## 2025-07-14 PROCEDURE — 11042 DBRDMT SUBQ TIS 1ST 20SQCM/<: CPT | Mod: 59

## 2025-07-14 PROCEDURE — 11042 DBRDMT SUBQ TIS 1ST 20SQCM/<: CPT | Mod: LT

## 2025-07-21 ENCOUNTER — OUTPATIENT (OUTPATIENT)
Dept: OUTPATIENT SERVICES | Facility: HOSPITAL | Age: 65
LOS: 1 days | End: 2025-07-21
Payer: COMMERCIAL

## 2025-07-21 DIAGNOSIS — M79.662 PAIN IN LEFT LOWER LEG: ICD-10-CM
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
## 2025-07-21 DIAGNOSIS — R60.9 EDEMA, UNSPECIFIED: ICD-10-CM

## 2025-07-21 DIAGNOSIS — E11.622 TYPE 2 DIABETES MELLITUS WITH OTHER SKIN ULCER: ICD-10-CM

## 2025-07-21 DIAGNOSIS — I73.9 PERIPHERAL VASCULAR DISEASE, UNSPECIFIED: ICD-10-CM

## 2025-07-21 DIAGNOSIS — Z98.890 OTHER SPECIFIED POSTPROCEDURAL STATES: Chronic | ICD-10-CM

## 2025-07-21 DIAGNOSIS — L97.822 NON-PRESSURE CHRONIC ULCER OF OTHER PART OF LEFT LOWER LEG WITH FAT LAYER EXPOSED: ICD-10-CM

## 2025-07-21 DIAGNOSIS — S80.12XS CONTUSION OF LEFT LOWER LEG, SEQUELA: ICD-10-CM
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
## 2025-07-21 PROCEDURE — 11042 DBRDMT SUBQ TIS 1ST 20SQCM/<: CPT

## 2025-07-25 DIAGNOSIS — E11.621 TYPE 2 DIABETES MELLITUS WITH FOOT ULCER: ICD-10-CM

## 2025-07-25 DIAGNOSIS — R60.9 EDEMA, UNSPECIFIED: ICD-10-CM

## 2025-07-25 DIAGNOSIS — L97.822 NON-PRESSURE CHRONIC ULCER OF OTHER PART OF LEFT LOWER LEG WITH FAT LAYER EXPOSED: ICD-10-CM

## 2025-07-25 DIAGNOSIS — I73.9 PERIPHERAL VASCULAR DISEASE, UNSPECIFIED: ICD-10-CM

## 2025-07-25 DIAGNOSIS — M79.662 PAIN IN LEFT LOWER LEG: ICD-10-CM

## 2025-07-28 ENCOUNTER — OUTPATIENT (OUTPATIENT)
Dept: OUTPATIENT SERVICES | Facility: HOSPITAL | Age: 65
LOS: 1 days | End: 2025-07-28
Payer: COMMERCIAL

## 2025-07-28 DIAGNOSIS — R60.9 EDEMA, UNSPECIFIED: ICD-10-CM

## 2025-07-28 DIAGNOSIS — I73.9 PERIPHERAL VASCULAR DISEASE, UNSPECIFIED: ICD-10-CM

## 2025-07-28 DIAGNOSIS — S80.12XS CONTUSION OF LEFT LOWER LEG, SEQUELA: ICD-10-CM

## 2025-07-28 DIAGNOSIS — E11.622 TYPE 2 DIABETES MELLITUS WITH OTHER SKIN ULCER: ICD-10-CM

## 2025-07-28 DIAGNOSIS — L97.822 NON-PRESSURE CHRONIC ULCER OF OTHER PART OF LEFT LOWER LEG WITH FAT LAYER EXPOSED: ICD-10-CM
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
## 2025-07-28 DIAGNOSIS — M79.662 PAIN IN LEFT LOWER LEG: ICD-10-CM
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
## 2025-07-28 DIAGNOSIS — Z98.890 OTHER SPECIFIED POSTPROCEDURAL STATES: Chronic | ICD-10-CM

## 2025-07-28 PROCEDURE — 11042 DBRDMT SUBQ TIS 1ST 20SQCM/<: CPT

## 2025-08-04 ENCOUNTER — OUTPATIENT (OUTPATIENT)
Dept: OUTPATIENT SERVICES | Facility: HOSPITAL | Age: 65
LOS: 1 days | End: 2025-08-04

## 2025-08-04 DIAGNOSIS — Z98.890 OTHER SPECIFIED POSTPROCEDURAL STATES: Chronic | ICD-10-CM

## 2025-08-04 DIAGNOSIS — M79.662 PAIN IN LEFT LOWER LEG: ICD-10-CM

## 2025-08-04 PROCEDURE — 11042 DBRDMT SUBQ TIS 1ST 20SQCM/<: CPT

## 2025-08-11 ENCOUNTER — OUTPATIENT (OUTPATIENT)
Dept: OUTPATIENT SERVICES | Facility: HOSPITAL | Age: 65
LOS: 1 days | End: 2025-08-11
Payer: COMMERCIAL

## 2025-08-11 DIAGNOSIS — Z98.890 OTHER SPECIFIED POSTPROCEDURAL STATES: Chronic | ICD-10-CM

## 2025-08-11 DIAGNOSIS — M79.662 PAIN IN LEFT LOWER LEG: ICD-10-CM

## 2025-08-11 PROCEDURE — 11042 DBRDMT SUBQ TIS 1ST 20SQCM/<: CPT

## 2025-08-18 ENCOUNTER — OUTPATIENT (OUTPATIENT)
Dept: OUTPATIENT SERVICES | Facility: HOSPITAL | Age: 65
LOS: 1 days | End: 2025-08-18
Payer: COMMERCIAL

## 2025-08-18 DIAGNOSIS — M79.662 PAIN IN LEFT LOWER LEG: ICD-10-CM

## 2025-08-18 DIAGNOSIS — E11.622 TYPE 2 DIABETES MELLITUS WITH OTHER SKIN ULCER: ICD-10-CM

## 2025-08-18 DIAGNOSIS — L97.822 NON-PRESSURE CHRONIC ULCER OF OTHER PART OF LEFT LOWER LEG WITH FAT LAYER EXPOSED: ICD-10-CM

## 2025-08-18 DIAGNOSIS — R60.9 EDEMA, UNSPECIFIED: ICD-10-CM

## 2025-08-18 DIAGNOSIS — I73.9 PERIPHERAL VASCULAR DISEASE, UNSPECIFIED: ICD-10-CM

## 2025-08-18 DIAGNOSIS — Z98.890 OTHER SPECIFIED POSTPROCEDURAL STATES: Chronic | ICD-10-CM

## 2025-08-18 PROCEDURE — 11042 DBRDMT SUBQ TIS 1ST 20SQCM/<: CPT

## 2025-08-22 DIAGNOSIS — R60.9 EDEMA, UNSPECIFIED: ICD-10-CM

## 2025-08-22 DIAGNOSIS — M79.662 PAIN IN LEFT LOWER LEG: ICD-10-CM

## 2025-08-22 DIAGNOSIS — L97.822 NON-PRESSURE CHRONIC ULCER OF OTHER PART OF LEFT LOWER LEG WITH FAT LAYER EXPOSED: ICD-10-CM

## 2025-08-22 DIAGNOSIS — E11.621 TYPE 2 DIABETES MELLITUS WITH FOOT ULCER: ICD-10-CM

## 2025-08-22 DIAGNOSIS — I73.9 PERIPHERAL VASCULAR DISEASE, UNSPECIFIED: ICD-10-CM
